# Patient Record
Sex: MALE | Race: BLACK OR AFRICAN AMERICAN | NOT HISPANIC OR LATINO | Employment: FULL TIME | URBAN - METROPOLITAN AREA
[De-identification: names, ages, dates, MRNs, and addresses within clinical notes are randomized per-mention and may not be internally consistent; named-entity substitution may affect disease eponyms.]

---

## 2018-02-07 ENCOUNTER — OFFICE VISIT (OUTPATIENT)
Dept: FAMILY MEDICINE | Facility: CLINIC | Age: 43
End: 2018-02-07
Payer: COMMERCIAL

## 2018-02-07 VITALS
WEIGHT: 216.06 LBS | DIASTOLIC BLOOD PRESSURE: 80 MMHG | HEIGHT: 71 IN | BODY MASS INDEX: 30.25 KG/M2 | SYSTOLIC BLOOD PRESSURE: 122 MMHG | RESPIRATION RATE: 18 BRPM | HEART RATE: 71 BPM | OXYGEN SATURATION: 98 % | TEMPERATURE: 98 F

## 2018-02-07 DIAGNOSIS — G89.29 CHRONIC BILATERAL LOW BACK PAIN WITHOUT SCIATICA: ICD-10-CM

## 2018-02-07 DIAGNOSIS — N52.9 ERECTILE DYSFUNCTION, UNSPECIFIED ERECTILE DYSFUNCTION TYPE: ICD-10-CM

## 2018-02-07 DIAGNOSIS — Z00.01 ANNUAL VISIT FOR GENERAL ADULT MEDICAL EXAMINATION WITH ABNORMAL FINDINGS: Primary | ICD-10-CM

## 2018-02-07 DIAGNOSIS — M54.50 CHRONIC BILATERAL LOW BACK PAIN WITHOUT SCIATICA: ICD-10-CM

## 2018-02-07 PROCEDURE — 99386 PREV VISIT NEW AGE 40-64: CPT | Mod: S$GLB,,, | Performed by: FAMILY MEDICINE

## 2018-02-07 PROCEDURE — 99999 PR PBB SHADOW E&M-NEW PATIENT-LVL IV: CPT | Mod: PBBFAC,,, | Performed by: FAMILY MEDICINE

## 2018-02-07 NOTE — PROGRESS NOTES
HPI:  Albert Escoto is a 42 y.o. year old male that  presents to become established. He has quit smoking since Sunday. He smoked about a pack a day for 15 years.  He also has complaint of low back pain  Which is sometimes both sides and sometimes on the left side. He states that the problem has been going on for years. He states that it has not been worse lately., but is it has been persistent. He has never had x-rays studies of his back. He takes Ibuprofen for pain , taking 2 - 3 at a time.  He has been having trouble with holding an erection over the last 2 years and has been getting worse. He denies any problem with his urination.He has not tried anything to help it.He level of fatigue does not seem to have an impact.  Chief Complaint   Patient presents with    [facility]     pt is trying to stop smoking--hasn't had cigarrete since Sunday    Back Pain    Erectile Dysfunction   .     HPI    History reviewed. No pertinent past medical history.  Social History     Social History    Marital status:      Spouse name: N/A    Number of children: N/A    Years of education: N/A     Occupational History    Not on file.     Social History Main Topics    Smoking status: Current Every Day Smoker     Packs/day: 1.00     Types: Cigarettes    Smokeless tobacco: Never Used    Alcohol use Yes      Comment: occasional    Drug use: No    Sexual activity: Yes     Other Topics Concern    Not on file     Social History Narrative    No narrative on file     History reviewed. No pertinent surgical history.  Family History   Problem Relation Age of Onset    Brain cancer Mother     Lung disease Mother     No Known Problems Father     No Known Problems Sister     Diabetes Brother     No Known Problems Daughter     No Known Problems Son     No Known Problems Brother     No Known Problems Brother     No Known Problems Daughter     No Known Problems Daughter     No Known Problems Daughter     No  "Known Problems Son     No Known Problems Son     No Known Problems Son     No Known Problems Son     No Known Problems Son     No Known Problems Son            Review of Systems  General ROS: negative for chills, fever or weight loss  Psychological ROS: negative for hallucination, depression or suicidal ideation  Ophthalmic ROS: negative for blurry vision, photophobia or eye pain  ENT ROS: negative for epistaxis, sore throat or rhinorrhea  Respiratory ROS: no cough, shortness of breath, or wheezing  Cardiovascular ROS: no chest pain or dyspnea on exertion  Gastrointestinal ROS: no abdominal pain, change in bowel habits, or black/ bloody stools  Genito-Urinary ROS: no dysuria, trouble voiding, or hematuria  Musculoskeletal ROS: negative for gait disturbance or muscular weakness, pos lower back pain  Neurological ROS: no syncope or seizures; no ataxia  Dermatological ROS: negative for pruritis, rash and jaundice      Physical Exam:  /80 (BP Location: Right arm, Patient Position: Sitting, BP Method: Medium (Manual))   Pulse 71   Temp 97.8 °F (36.6 °C) (Oral)   Resp 18   Ht 5' 11" (1.803 m)   Wt 98 kg (216 lb 0.8 oz)   SpO2 98%   BMI 30.13 kg/m²   General appearance: alert, cooperative, no distress  Constitutional:Oriented to person, place, and time.appears well-developed and well-nourished.  HEENT: Normocephalic, atraumatic, neck symmetrical, no nasal discharge, TM - clear bilaterally   Eyes: conjunctivae/corneas clear, PERRL, EOM's intact  Lungs: clear to auscultation bilaterally, no dullness to percussion bilaterally  Heart: regular rate and rhythm without rub; no displacement of the PMI   Abdomen: soft, non-tender; bowel sounds normoactive; no organomegaly  Extremities: extremities symmetric; no clubbing, cyanosis, or edema  Integument: Skin color, texture, turgor normal; no rashes; hair distrubution normal  Neurologic: Alert and oriented X 3, normal strength, normal coordination and " gait  Psychiatric: no pressured speech; normal affect; no evidence of impaired cognition   Physical Exam  LABS:    Complete Blood Count  Lab Results   Component Value Date    RBC 5.27 02/09/2018    HGB 14.7 02/09/2018    HCT 45.3 02/09/2018    MCV 86 02/09/2018    MCH 27.9 02/09/2018    MCHC 32.5 02/09/2018    RDW 12.6 02/09/2018     02/09/2018    MPV 11.1 02/09/2018    GRAN 3.1 02/09/2018    GRAN 46.3 02/09/2018    LYMPH 2.6 02/09/2018    LYMPH 39.2 02/09/2018    MONO 0.7 02/09/2018    MONO 11.0 02/09/2018    EOS 0.2 02/09/2018    BASO 0.05 02/09/2018    EOSINOPHIL 2.7 02/09/2018    BASOPHIL 0.8 02/09/2018    DIFFMETHOD Automated 02/09/2018       Comprehensive Metabolic Panel  Lab Results   Component Value Date    GLU 94 02/09/2018    BUN 11 02/09/2018    CREATININE 0.94 02/09/2018     02/09/2018    K 4.3 02/09/2018     02/09/2018    PROT 8.0 02/09/2018    ALBUMIN 4.5 02/09/2018    BILITOT 0.5 02/09/2018    AST 38 02/09/2018    ALKPHOS 70 02/09/2018    CO2 26 02/09/2018    ALT 32 02/09/2018    ANIONGAP 9 02/09/2018    EGFRNONAA >60.0 02/09/2018    ESTGFRAFRICA >60.0 02/09/2018       LIPID  Lab Results   Component Value Date    CHOL 260 (H) 02/09/2018    HDL 42 02/09/2018       TSH  Lab Results   Component Value Date    TSH 2.230 02/09/2018       No current outpatient prescriptions on file.     No current facility-administered medications for this visit.        Assessment:    ICD-10-CM ICD-9-CM    1. Annual visit for general adult medical examination with abnormal findings Z00.01 V70.0 Comprehensive metabolic panel      Lipid panel      CBC auto differential      TSH   2. Erectile dysfunction, unspecified erectile dysfunction type N52.9 607.84 Testosterone   3. Chronic bilateral low back pain without sciatica M54.5 724.2 Comprehensive metabolic panel    G89.29 338.29 Lipid panel      CBC auto differential      TSH      X-Ray Lumbar Spine Complete 5 View         Plan:  Encouraged pt to seek out  marital consoling for communication skills.  Follow-up in 9 days (on 2/16/2018).          Yamileth Escoto MD

## 2018-03-12 ENCOUNTER — OFFICE VISIT (OUTPATIENT)
Dept: FAMILY MEDICINE | Facility: CLINIC | Age: 43
End: 2018-03-12
Payer: COMMERCIAL

## 2018-03-12 VITALS
HEART RATE: 74 BPM | OXYGEN SATURATION: 98 % | RESPIRATION RATE: 18 BRPM | WEIGHT: 214 LBS | BODY MASS INDEX: 29.96 KG/M2 | DIASTOLIC BLOOD PRESSURE: 72 MMHG | HEIGHT: 71 IN | TEMPERATURE: 98 F | SYSTOLIC BLOOD PRESSURE: 122 MMHG

## 2018-03-12 DIAGNOSIS — E78.5 HYPERLIPIDEMIA, UNSPECIFIED HYPERLIPIDEMIA TYPE: Primary | ICD-10-CM

## 2018-03-12 PROCEDURE — 99214 OFFICE O/P EST MOD 30 MIN: CPT | Mod: S$GLB,,, | Performed by: FAMILY MEDICINE

## 2018-03-12 PROCEDURE — 99999 PR PBB SHADOW E&M-EST. PATIENT-LVL III: CPT | Mod: PBBFAC,,, | Performed by: FAMILY MEDICINE

## 2018-03-12 RX ORDER — ATORVASTATIN CALCIUM 10 MG/1
10 TABLET, FILM COATED ORAL DAILY
Qty: 90 TABLET | Refills: 3 | Status: SHIPPED | OUTPATIENT
Start: 2018-03-12 | End: 2018-06-28 | Stop reason: SDUPTHER

## 2018-03-18 NOTE — PROGRESS NOTES
HPI:  Albert Escoto is a 43 y.o. year old male that  presents for f/u of lab results. He states that since his last visit he and his wife are communicating better and have not been arguing. He admits that his life stressors and lack of great communuication skills had been getting in the way of his ability to relax at home.  Chief Complaint   Patient presents with    Follow-up     lab, xray results   .     HPI      History reviewed. No pertinent past medical history.  Social History     Social History    Marital status:      Spouse name: N/A    Number of children: N/A    Years of education: N/A     Occupational History    Not on file.     Social History Main Topics    Smoking status: Current Every Day Smoker     Packs/day: 1.00     Types: Cigarettes    Smokeless tobacco: Never Used    Alcohol use Yes      Comment: occasional    Drug use: No    Sexual activity: Yes     Other Topics Concern    Not on file     Social History Narrative    No narrative on file     History reviewed. No pertinent surgical history.  Family History   Problem Relation Age of Onset    Brain cancer Mother     Lung disease Mother     No Known Problems Father     No Known Problems Sister     Diabetes Brother     No Known Problems Daughter     No Known Problems Son     No Known Problems Brother     No Known Problems Brother     No Known Problems Daughter     No Known Problems Daughter     No Known Problems Daughter     No Known Problems Son     No Known Problems Son     No Known Problems Son     No Known Problems Son     No Known Problems Son     No Known Problems Son            Review of Systems  General ROS: negative for chills, fever or weight loss  ENT ROS: negative for epistaxis, sore throat or rhinorrhea  Respiratory ROS: no cough, shortness of breath, or wheezing  Cardiovascular ROS: no chest pain or dyspnea on exertion  Gastrointestinal ROS: no abdominal pain, change in bowel habits, or black/  "bloody stools    Physical Exam:  /72 (BP Location: Left arm, Patient Position: Sitting, BP Method: Medium (Manual))   Pulse 74   Temp 98.3 °F (36.8 °C) (Oral)   Resp 18   Ht 5' 11" (1.803 m)   Wt 97.1 kg (214 lb)   SpO2 98%   BMI 29.85 kg/m²   General appearance: alert, cooperative, no distress  Constitutional:Oriented to person, place, and time.appears well-developed and well-nourishe  Lungs: clear to auscultation bilaterally, no dullness to percussion bilaterally  Heart: regular rate and rhythm without rub; no displacement of the PMI , S1&S2 present  Physical Exam    LABS:    Complete Blood Count  Lab Results   Component Value Date    RBC 5.27 02/09/2018    HGB 14.7 02/09/2018    HCT 45.3 02/09/2018    MCV 86 02/09/2018    MCH 27.9 02/09/2018    MCHC 32.5 02/09/2018    RDW 12.6 02/09/2018     02/09/2018    MPV 11.1 02/09/2018    GRAN 3.1 02/09/2018    GRAN 46.3 02/09/2018    LYMPH 2.6 02/09/2018    LYMPH 39.2 02/09/2018    MONO 0.7 02/09/2018    MONO 11.0 02/09/2018    EOS 0.2 02/09/2018    BASO 0.05 02/09/2018    EOSINOPHIL 2.7 02/09/2018    BASOPHIL 0.8 02/09/2018    DIFFMETHOD Automated 02/09/2018       Comprehensive Metabolic Panel  Lab Results   Component Value Date    GLU 94 02/09/2018    BUN 11 02/09/2018    CREATININE 0.94 02/09/2018     02/09/2018    K 4.3 02/09/2018     02/09/2018    PROT 8.0 02/09/2018    ALBUMIN 4.5 02/09/2018    BILITOT 0.5 02/09/2018    AST 38 02/09/2018    ALKPHOS 70 02/09/2018    CO2 26 02/09/2018    ALT 32 02/09/2018    ANIONGAP 9 02/09/2018    EGFRNONAA >60.0 02/09/2018    ESTGFRAFRICA >60.0 02/09/2018       LIPID  Lab Results   Component Value Date    CHOL 260 (H) 02/09/2018    HDL 42 02/09/2018         TSH  Lab Results   Component Value Date    TSH 2.230 02/09/2018       Current Outpatient Prescriptions   Medication Sig Dispense Refill    atorvastatin (LIPITOR) 10 MG tablet Take 1 tablet (10 mg total) by mouth once daily. 90 tablet 3     No " current facility-administered medications for this visit.        Assessment:    ICD-10-CM ICD-9-CM    1. Hyperlipidemia, unspecified hyperlipidemia type E78.5 272.4 atorvastatin (LIPITOR) 10 MG tablet      Lipid panel         Plan:  Will continue conservative measures for lower back pain,.  Will start Lipitor and recheck levels in 3 months.  Follow-up in 3 months (on 6/22/2018).          Yamileth Escoto MD

## 2018-06-28 ENCOUNTER — OFFICE VISIT (OUTPATIENT)
Dept: FAMILY MEDICINE | Facility: CLINIC | Age: 43
End: 2018-06-28
Payer: COMMERCIAL

## 2018-06-28 VITALS
RESPIRATION RATE: 18 BRPM | HEIGHT: 71 IN | WEIGHT: 214 LBS | DIASTOLIC BLOOD PRESSURE: 78 MMHG | SYSTOLIC BLOOD PRESSURE: 136 MMHG | BODY MASS INDEX: 29.96 KG/M2 | TEMPERATURE: 98 F | HEART RATE: 64 BPM | OXYGEN SATURATION: 96 %

## 2018-06-28 DIAGNOSIS — L03.211 CELLULITIS OF FACE: Primary | ICD-10-CM

## 2018-06-28 DIAGNOSIS — E78.5 HYPERLIPIDEMIA, UNSPECIFIED HYPERLIPIDEMIA TYPE: ICD-10-CM

## 2018-06-28 PROCEDURE — 99214 OFFICE O/P EST MOD 30 MIN: CPT | Mod: S$GLB,,, | Performed by: FAMILY MEDICINE

## 2018-06-28 PROCEDURE — 3008F BODY MASS INDEX DOCD: CPT | Mod: CPTII,S$GLB,, | Performed by: FAMILY MEDICINE

## 2018-06-28 PROCEDURE — 99999 PR PBB SHADOW E&M-EST. PATIENT-LVL III: CPT | Mod: PBBFAC,,, | Performed by: FAMILY MEDICINE

## 2018-06-28 RX ORDER — ATORVASTATIN CALCIUM 20 MG/1
20 TABLET, FILM COATED ORAL DAILY
Qty: 90 TABLET | Refills: 0 | Status: SHIPPED | OUTPATIENT
Start: 2018-06-28 | End: 2020-02-03

## 2018-06-28 RX ORDER — SULFAMETHOXAZOLE AND TRIMETHOPRIM 800; 160 MG/1; MG/1
1 TABLET ORAL 2 TIMES DAILY
Qty: 14 TABLET | Refills: 0 | Status: SHIPPED | OUTPATIENT
Start: 2018-06-28 | End: 2018-07-05

## 2018-06-30 NOTE — PROGRESS NOTES
HPI:  Albert Escoto is a 43 y.o. year old male that  presents with concern of left eyelid swelling. It started with a bump that progressed into swelling of his left eyeid.  Chief Complaint   Patient presents with    Follow-up     lab results    Medication Refill     Lipitor    Eye Problem     left eye swollen   .     HPI      History reviewed. No pertinent past medical history.  Social History     Social History    Marital status:      Spouse name: N/A    Number of children: N/A    Years of education: N/A     Occupational History    Not on file.     Social History Main Topics    Smoking status: Current Every Day Smoker     Packs/day: 1.00     Types: Cigarettes    Smokeless tobacco: Never Used    Alcohol use Yes      Comment: occasional    Drug use: No    Sexual activity: Yes     Other Topics Concern    Not on file     Social History Narrative    No narrative on file     History reviewed. No pertinent surgical history.  Family History   Problem Relation Age of Onset    Brain cancer Mother     Lung disease Mother     No Known Problems Father     No Known Problems Sister     Diabetes Brother     No Known Problems Daughter     No Known Problems Son     No Known Problems Brother     No Known Problems Brother     No Known Problems Daughter     No Known Problems Daughter     No Known Problems Daughter     No Known Problems Son     No Known Problems Son     No Known Problems Son     No Known Problems Son     No Known Problems Son     No Known Problems Son            Review of Systems  General ROS: negative for chills, fever or weight loss  ENT ROS: negative for epistaxis, sore throat or rhinorrhea  Respiratory ROS: no cough, shortness of breath, or wheezing  Cardiovascular ROS: no chest pain or dyspnea on exertion  Gastrointestinal ROS: no abdominal pain, change in bowel habits, or black/ bloody stools    Physical Exam:  /78   Pulse 64   Temp 98 °F (36.7 °C) (Oral)   Resp  "18   Ht 5' 11" (1.803 m)   Wt 97.1 kg (214 lb)   SpO2 96%   BMI 29.85 kg/m²   General appearance: alert, cooperative, no distress  Constitutional:Oriented to person, place, and time.appears well-developed and well-nourished.  HEENT: Normocephalic, atraumatic, neck symmetrical, no nasal discharge, TM- clear bilaterally  Lungs: clear to auscultation bilaterally, no dullness to percussion bilaterally  Heart: regular rate and rhythm without rub; no displacement of the PMI , S1&S2 present  Abdomen: soft, non-tender; bowel sounds normoactive; no organomegaly  Skin   ; left eyelid with swelling and lateral single swelling on left eyelid  Physical Exam    LABS:    Complete Blood Count  Lab Results   Component Value Date    RBC 5.27 02/09/2018    HGB 14.7 02/09/2018    HCT 45.3 02/09/2018    MCV 86 02/09/2018    MCH 27.9 02/09/2018    MCHC 32.5 02/09/2018    RDW 12.6 02/09/2018     02/09/2018    MPV 11.1 02/09/2018    GRAN 3.1 02/09/2018    GRAN 46.3 02/09/2018    LYMPH 2.6 02/09/2018    LYMPH 39.2 02/09/2018    MONO 0.7 02/09/2018    MONO 11.0 02/09/2018    EOS 0.2 02/09/2018    BASO 0.05 02/09/2018    EOSINOPHIL 2.7 02/09/2018    BASOPHIL 0.8 02/09/2018    DIFFMETHOD Automated 02/09/2018       Comprehensive Metabolic Panel  Lab Results   Component Value Date    GLU 94 02/09/2018    BUN 11 02/09/2018    CREATININE 0.94 02/09/2018     02/09/2018    K 4.3 02/09/2018     02/09/2018    PROT 8.0 02/09/2018    ALBUMIN 4.5 02/09/2018    BILITOT 0.5 02/09/2018    AST 38 02/09/2018    ALKPHOS 70 02/09/2018    CO2 26 02/09/2018    ALT 32 02/09/2018    ANIONGAP 9 02/09/2018    EGFRNONAA >60.0 02/09/2018    ESTGFRAFRICA >60.0 02/09/2018       LIPID  Lab Results   Component Value Date    CHOL 201 (H) 06/15/2018    HDL 40 06/15/2018         TSH  Lab Results   Component Value Date    TSH 2.230 02/09/2018       Current Outpatient Prescriptions   Medication Sig Dispense Refill    atorvastatin (LIPITOR) 20 MG tablet " Take 1 tablet (20 mg total) by mouth once daily. 90 tablet 0    sulfamethoxazole-trimethoprim 800-160mg (BACTRIM DS) 800-160 mg Tab Take 1 tablet by mouth 2 (two) times daily. for 7 days 14 tablet 0     No current facility-administered medications for this visit.        Assessment:    ICD-10-CM ICD-9-CM    1. Cellulitis of face L03.211 682.0 sulfamethoxazole-trimethoprim 800-160mg (BACTRIM DS) 800-160 mg Tab   2. Hyperlipidemia, unspecified hyperlipidemia type E78.5 272.4 atorvastatin (LIPITOR) 20 MG tablet         Plan:    Follow-up in 3 months (on 9/28/2018).          Yamileth Escoto MD

## 2020-01-20 ENCOUNTER — OFFICE VISIT (OUTPATIENT)
Dept: FAMILY MEDICINE | Facility: CLINIC | Age: 45
End: 2020-01-20
Payer: COMMERCIAL

## 2020-01-20 VITALS
HEART RATE: 73 BPM | WEIGHT: 213.38 LBS | TEMPERATURE: 98 F | OXYGEN SATURATION: 98 % | SYSTOLIC BLOOD PRESSURE: 122 MMHG | DIASTOLIC BLOOD PRESSURE: 90 MMHG | HEIGHT: 71 IN | BODY MASS INDEX: 29.87 KG/M2

## 2020-01-20 DIAGNOSIS — Z13.220 SCREENING FOR HYPERLIPIDEMIA: ICD-10-CM

## 2020-01-20 DIAGNOSIS — R03.0 ELEVATED BLOOD PRESSURE READING WITHOUT DIAGNOSIS OF HYPERTENSION: ICD-10-CM

## 2020-01-20 DIAGNOSIS — Z13.29 THYROID DISORDER SCREENING: ICD-10-CM

## 2020-01-20 DIAGNOSIS — N52.9 ERECTILE DYSFUNCTION, UNSPECIFIED ERECTILE DYSFUNCTION TYPE: Primary | ICD-10-CM

## 2020-01-20 DIAGNOSIS — E66.3 OVERWEIGHT (BMI 25.0-29.9): ICD-10-CM

## 2020-01-20 DIAGNOSIS — Z12.5 SCREENING FOR PROSTATE CANCER: ICD-10-CM

## 2020-01-20 DIAGNOSIS — Z13.0 SCREENING FOR DEFICIENCY ANEMIA: ICD-10-CM

## 2020-01-20 DIAGNOSIS — H61.23 BILATERAL IMPACTED CERUMEN: ICD-10-CM

## 2020-01-20 LAB
ALBUMIN SERPL BCP-MCNC: 4.3 G/DL (ref 3.5–5.2)
ALP SERPL-CCNC: 79 U/L (ref 55–135)
ALT SERPL W/O P-5'-P-CCNC: 18 U/L (ref 10–44)
ANION GAP SERPL CALC-SCNC: 8 MMOL/L (ref 8–16)
AST SERPL-CCNC: 21 U/L (ref 10–40)
BASOPHILS # BLD AUTO: 0.06 K/UL (ref 0–0.2)
BASOPHILS NFR BLD: 0.9 % (ref 0–1.9)
BILIRUB SERPL-MCNC: 0.5 MG/DL (ref 0.1–1)
BUN SERPL-MCNC: 11 MG/DL (ref 6–20)
CALCIUM SERPL-MCNC: 9.6 MG/DL (ref 8.7–10.5)
CHLORIDE SERPL-SCNC: 107 MMOL/L (ref 95–110)
CHOLEST SERPL-MCNC: 243 MG/DL (ref 120–199)
CHOLEST/HDLC SERPL: 6.1 {RATIO} (ref 2–5)
CO2 SERPL-SCNC: 25 MMOL/L (ref 23–29)
COMPLEXED PSA SERPL-MCNC: 0.36 NG/ML (ref 0–4)
CREAT SERPL-MCNC: 0.9 MG/DL (ref 0.5–1.4)
DIFFERENTIAL METHOD: ABNORMAL
EOSINOPHIL # BLD AUTO: 0.1 K/UL (ref 0–0.5)
EOSINOPHIL NFR BLD: 1.4 % (ref 0–8)
ERYTHROCYTE [DISTWIDTH] IN BLOOD BY AUTOMATED COUNT: 12.8 % (ref 11.5–14.5)
EST. GFR  (AFRICAN AMERICAN): >60 ML/MIN/1.73 M^2
EST. GFR  (NON AFRICAN AMERICAN): >60 ML/MIN/1.73 M^2
GLUCOSE SERPL-MCNC: 95 MG/DL (ref 70–110)
HCT VFR BLD AUTO: 47.2 % (ref 40–54)
HDLC SERPL-MCNC: 40 MG/DL (ref 40–75)
HDLC SERPL: 16.5 % (ref 20–50)
HGB BLD-MCNC: 14.5 G/DL (ref 14–18)
IMM GRANULOCYTES # BLD AUTO: 0.02 K/UL (ref 0–0.04)
IMM GRANULOCYTES NFR BLD AUTO: 0.3 % (ref 0–0.5)
LDLC SERPL CALC-MCNC: 176.4 MG/DL (ref 63–159)
LYMPHOCYTES # BLD AUTO: 2.3 K/UL (ref 1–4.8)
LYMPHOCYTES NFR BLD: 34.9 % (ref 18–48)
MCH RBC QN AUTO: 28.1 PG (ref 27–31)
MCHC RBC AUTO-ENTMCNC: 30.7 G/DL (ref 32–36)
MCV RBC AUTO: 92 FL (ref 82–98)
MONOCYTES # BLD AUTO: 0.7 K/UL (ref 0.3–1)
MONOCYTES NFR BLD: 10.5 % (ref 4–15)
NEUTROPHILS # BLD AUTO: 3.5 K/UL (ref 1.8–7.7)
NEUTROPHILS NFR BLD: 52 % (ref 38–73)
NONHDLC SERPL-MCNC: 203 MG/DL
NRBC BLD-RTO: 0 /100 WBC
PLATELET # BLD AUTO: 183 K/UL (ref 150–350)
PMV BLD AUTO: 11.2 FL (ref 9.2–12.9)
POTASSIUM SERPL-SCNC: 4.3 MMOL/L (ref 3.5–5.1)
PROT SERPL-MCNC: 7.8 G/DL (ref 6–8.4)
RBC # BLD AUTO: 5.16 M/UL (ref 4.6–6.2)
SODIUM SERPL-SCNC: 140 MMOL/L (ref 136–145)
TRIGL SERPL-MCNC: 133 MG/DL (ref 30–150)
TSH SERPL DL<=0.005 MIU/L-ACNC: 1.25 UIU/ML (ref 0.4–4)
WBC # BLD AUTO: 6.64 K/UL (ref 3.9–12.7)

## 2020-01-20 PROCEDURE — 99214 OFFICE O/P EST MOD 30 MIN: CPT | Mod: 25,S$GLB,, | Performed by: NURSE PRACTITIONER

## 2020-01-20 PROCEDURE — 69209 REMOVE IMPACTED EAR WAX UNI: CPT | Mod: 50,S$GLB,, | Performed by: NURSE PRACTITIONER

## 2020-01-20 PROCEDURE — 85025 COMPLETE CBC W/AUTO DIFF WBC: CPT

## 2020-01-20 PROCEDURE — 99214 PR OFFICE/OUTPT VISIT, EST, LEVL IV, 30-39 MIN: ICD-10-PCS | Mod: 25,S$GLB,, | Performed by: NURSE PRACTITIONER

## 2020-01-20 PROCEDURE — 84443 ASSAY THYROID STIM HORMONE: CPT

## 2020-01-20 PROCEDURE — 99999 PR PBB SHADOW E&M-EST. PATIENT-LVL III: CPT | Mod: PBBFAC,,, | Performed by: NURSE PRACTITIONER

## 2020-01-20 PROCEDURE — 80053 COMPREHEN METABOLIC PANEL: CPT

## 2020-01-20 PROCEDURE — 3008F PR BODY MASS INDEX (BMI) DOCUMENTED: ICD-10-PCS | Mod: CPTII,S$GLB,, | Performed by: NURSE PRACTITIONER

## 2020-01-20 PROCEDURE — 84153 ASSAY OF PSA TOTAL: CPT

## 2020-01-20 PROCEDURE — 80061 LIPID PANEL: CPT

## 2020-01-20 PROCEDURE — 3008F BODY MASS INDEX DOCD: CPT | Mod: CPTII,S$GLB,, | Performed by: NURSE PRACTITIONER

## 2020-01-20 PROCEDURE — 99999 PR PBB SHADOW E&M-EST. PATIENT-LVL III: ICD-10-PCS | Mod: PBBFAC,,, | Performed by: NURSE PRACTITIONER

## 2020-01-20 PROCEDURE — 36415 COLL VENOUS BLD VENIPUNCTURE: CPT

## 2020-01-20 PROCEDURE — 69209 EAR CERUMEN REMOVAL: ICD-10-PCS | Mod: 50,S$GLB,, | Performed by: NURSE PRACTITIONER

## 2020-01-20 RX ORDER — TADALAFIL 10 MG/1
10 TABLET ORAL DAILY PRN
Qty: 10 TABLET | Refills: 0 | Status: SHIPPED | OUTPATIENT
Start: 2020-01-20 | End: 2020-02-03 | Stop reason: SDUPTHER

## 2020-01-20 NOTE — PROGRESS NOTES
Subjective:       Patient ID: Albert Escoto is a 44 y.o. male.    Chief Complaint: Erectile Dysfunction (pt states having issues with ED, and would like a check up. Pt wants Flu shot as well.)    43 y/o male with history of hyperlipidemia and erectile dysfunction presents to clinic for follow up.     THIS PATIENT IS NEW TO ME. PREVIOUS PATIENT OF DR. NELSY ESCOTO.    Patient has history of hyperlipidemia. Previously prescribed atorvastatin in 2018. States he has not taken the medication in > 1 year. Denies medication side effects. States he was unsure if he really needed the medication.    Patient reports history of erectile dysfunction. Normal testosterone level in 2018. He has not tried any medication in the past. Reports problems with obtain an erection sometimes. Denies dysuria, urinary frequency or urgency.    CBC:  Recent Labs   Lab 02/09/18 0612   WBC 6.64   RBC 5.27   Hemoglobin 14.7   Hematocrit 45.3   Platelets 192   Mean Corpuscular Volume 86   Mean Corpuscular Hemoglobin 27.9   Mean Corpuscular Hemoglobin Conc 32.5     CMP:  Recent Labs   Lab 02/09/18 0612   Glucose 94   Calcium 9.7   Albumin 4.5   Total Protein 8.0   Sodium 141   Potassium 4.3   CO2 26   Chloride 106   BUN, Bld 11   Creatinine 0.94   Alkaline Phosphatase 70   ALT 32   AST 38   Total Bilirubin 0.5     LIPIDS:  Recent Labs   Lab 02/09/18  0612 06/15/18  0846   TSH 2.230  --    HDL 42 40   Cholesterol 260 H 201 H   Triglycerides 182 H 117   LDL Cholesterol 181.6 H 137.6   Hdl/Cholesterol Ratio 16.2 L 19.9 L   Non-HDL Cholesterol 218 161   Total Cholesterol/HDL Ratio 6.2 H 5.0     TSH:  Recent Labs   Lab 02/09/18 0612   TSH 2.230       Current Outpatient Medications   Medication Sig Dispense Refill    atorvastatin (LIPITOR) 20 MG tablet Take 1 tablet (20 mg total) by mouth once daily. (Patient not taking: Reported on 1/20/2020) 90 tablet 0    tadalafil (CIALIS) 10 MG tablet Take 1 tablet (10 mg total) by mouth daily as needed  for Erectile Dysfunction. 10 tablet 0     No current facility-administered medications for this visit.        History reviewed. No pertinent past medical history.    History reviewed. No pertinent surgical history.    Family History   Problem Relation Age of Onset    Brain cancer Mother     Lung disease Mother     No Known Problems Father     No Known Problems Sister     Diabetes Brother     No Known Problems Daughter     No Known Problems Son     No Known Problems Brother     No Known Problems Brother     No Known Problems Daughter     No Known Problems Daughter     No Known Problems Daughter     No Known Problems Son     No Known Problems Son     No Known Problems Son     No Known Problems Son     No Known Problems Son     No Known Problems Son        Social History     Socioeconomic History    Marital status:      Spouse name: Not on file    Number of children: Not on file    Years of education: Not on file    Highest education level: Not on file   Occupational History    Not on file   Social Needs    Financial resource strain: Not on file    Food insecurity:     Worry: Not on file     Inability: Not on file    Transportation needs:     Medical: Not on file     Non-medical: Not on file   Tobacco Use    Smoking status: Current Every Day Smoker     Packs/day: 1.00     Types: Cigarettes    Smokeless tobacco: Never Used   Substance and Sexual Activity    Alcohol use: Yes     Comment: occasional    Drug use: No    Sexual activity: Yes   Lifestyle    Physical activity:     Days per week: Not on file     Minutes per session: Not on file    Stress: Not on file   Relationships    Social connections:     Talks on phone: Not on file     Gets together: Not on file     Attends Worship service: Not on file     Active member of club or organization: Not on file     Attends meetings of clubs or organizations: Not on file     Relationship status: Not on file   Other Topics Concern     "Not on file   Social History Narrative    Not on file       Review of Systems   Constitutional: Negative for fatigue, fever and unexpected weight change.   HENT: Negative for congestion, nosebleeds, sore throat and trouble swallowing.    Eyes: Negative for visual disturbance.   Respiratory: Negative for cough and shortness of breath.    Cardiovascular: Negative for chest pain and palpitations.   Gastrointestinal: Negative for abdominal pain and constipation.   Genitourinary: Negative for difficulty urinating, dysuria and urgency.   Musculoskeletal: Negative for back pain and myalgias.   Allergic/Immunologic: Negative for immunocompromised state.   Neurological: Negative for weakness and headaches.   Hematological: Negative for adenopathy. Does not bruise/bleed easily.   Psychiatric/Behavioral: Negative for dysphoric mood.         Objective:     Vitals:    01/20/20 1103   BP: (!) 122/90   Pulse: 73   Temp: 97.7 °F (36.5 °C)   TempSrc: Oral   SpO2: 98%   Weight: 96.8 kg (213 lb 6.5 oz)   Height: 5' 11" (1.803 m)          Physical Exam   Constitutional: He is oriented to person, place, and time. He appears well-developed and well-nourished. No distress.   HENT:   Head: Normocephalic.   Nose: Nose normal.   Mouth/Throat: Uvula is midline, oropharynx is clear and moist and mucous membranes are normal.   Bilateral ear canal impacted with cerumen. Normal TMs and ear canal post irrigation.   Eyes: Pupils are equal, round, and reactive to light.   Neck: Normal range of motion. Neck supple.   Cardiovascular: Normal rate, regular rhythm and intact distal pulses.   Pulmonary/Chest: Effort normal and breath sounds normal.   Abdominal: Soft. Bowel sounds are normal.   Musculoskeletal: Normal range of motion.   Neurological: He is alert and oriented to person, place, and time. Gait normal.   Skin: Skin is warm and dry.   Psychiatric: He has a normal mood and affect. His behavior is normal.         Assessment:         ICD-10-CM " ICD-9-CM   1. Erectile dysfunction, unspecified erectile dysfunction type N52.9 607.84   2. Bilateral impacted cerumen H61.23 380.4   3. Elevated blood pressure reading without diagnosis of hypertension R03.0 796.2   4. Overweight (BMI 25.0-29.9) E66.3 278.02   5. Screening for deficiency anemia Z13.0 V78.1   6. Screening for hyperlipidemia Z13.220 V77.91   7. Screening for prostate cancer Z12.5 V76.44   8. Thyroid disorder screening Z13.29 V77.0       Plan:       Erectile dysfunction, unspecified erectile dysfunction type  -     tadalafil (CIALIS) 10 MG tablet; Take 1 tablet (10 mg total) by mouth daily as needed for Erectile Dysfunction.  Dispense: 10 tablet; Refill: 0    Bilateral impacted cerumen  -     Ear Cerumen Removal    Elevated blood pressure reading without diagnosis of hypertension        -     Keep log of blood pressure measurements twice daily, bring log to next appointment.    Overweight (BMI 25.0-29.9)       -      Advised healthy diet and exercise    Screening for deficiency anemia  -     CBC auto differential    Screening for hyperlipidemia  -     Comprehensive metabolic panel  -     Lipid panel    Screening for prostate cancer  -     PSA, Screening    Thyroid disorder screening  -     TSH      Follow up in about 2 weeks (around 2/3/2020) for for blood pressure check.     Patient's Medications   New Prescriptions    TADALAFIL (CIALIS) 10 MG TABLET    Take 1 tablet (10 mg total) by mouth daily as needed for Erectile Dysfunction.   Previous Medications    ATORVASTATIN (LIPITOR) 20 MG TABLET    Take 1 tablet (20 mg total) by mouth once daily.   Modified Medications    No medications on file   Discontinued Medications    No medications on file

## 2020-01-20 NOTE — PROCEDURES
Ear Cerumen Removal  Date/Time: 1/20/2020 11:00 AM  Performed by: AMI Crane  Authorized by: AMI Crane     Consent Done?:  Yes (Verbal)    Local anesthetic:  None  Location details:  Both ears  Procedure type: irrigation    Cerumen  Removal Results:  Cerumen completely removed  Patient tolerance:  Patient tolerated the procedure well with no immediate complications

## 2020-02-03 ENCOUNTER — OFFICE VISIT (OUTPATIENT)
Dept: FAMILY MEDICINE | Facility: CLINIC | Age: 45
End: 2020-02-03
Payer: COMMERCIAL

## 2020-02-03 VITALS
HEIGHT: 71 IN | WEIGHT: 210.63 LBS | HEART RATE: 77 BPM | TEMPERATURE: 98 F | DIASTOLIC BLOOD PRESSURE: 84 MMHG | SYSTOLIC BLOOD PRESSURE: 126 MMHG | BODY MASS INDEX: 29.49 KG/M2 | OXYGEN SATURATION: 97 %

## 2020-02-03 DIAGNOSIS — Z11.4 SCREENING FOR HIV WITHOUT PRESENCE OF RISK FACTORS: ICD-10-CM

## 2020-02-03 DIAGNOSIS — F17.210 TOBACCO DEPENDENCE DUE TO CIGARETTES: ICD-10-CM

## 2020-02-03 DIAGNOSIS — E66.3 OVERWEIGHT (BMI 25.0-29.9): ICD-10-CM

## 2020-02-03 DIAGNOSIS — Z23 NEEDS FLU SHOT: ICD-10-CM

## 2020-02-03 DIAGNOSIS — E78.5 HYPERLIPIDEMIA, UNSPECIFIED HYPERLIPIDEMIA TYPE: Primary | ICD-10-CM

## 2020-02-03 DIAGNOSIS — Z23 NEED FOR TDAP VACCINATION: ICD-10-CM

## 2020-02-03 DIAGNOSIS — N52.9 ERECTILE DYSFUNCTION, UNSPECIFIED ERECTILE DYSFUNCTION TYPE: ICD-10-CM

## 2020-02-03 PROCEDURE — 99214 OFFICE O/P EST MOD 30 MIN: CPT | Mod: 25,S$GLB,, | Performed by: NURSE PRACTITIONER

## 2020-02-03 PROCEDURE — 90471 IMMUNIZATION ADMIN: CPT | Mod: S$GLB,,, | Performed by: NURSE PRACTITIONER

## 2020-02-03 PROCEDURE — 99999 PR PBB SHADOW E&M-EST. PATIENT-LVL IV: CPT | Mod: PBBFAC,,, | Performed by: NURSE PRACTITIONER

## 2020-02-03 PROCEDURE — 99999 PR PBB SHADOW E&M-EST. PATIENT-LVL IV: ICD-10-PCS | Mod: PBBFAC,,, | Performed by: NURSE PRACTITIONER

## 2020-02-03 PROCEDURE — 90686 FLU VACCINE (QUAD) GREATER THAN OR EQUAL TO 3YO PRESERVATIVE FREE IM: ICD-10-PCS | Mod: S$GLB,,, | Performed by: NURSE PRACTITIONER

## 2020-02-03 PROCEDURE — 3008F BODY MASS INDEX DOCD: CPT | Mod: CPTII,S$GLB,, | Performed by: NURSE PRACTITIONER

## 2020-02-03 PROCEDURE — 90686 IIV4 VACC NO PRSV 0.5 ML IM: CPT | Mod: S$GLB,,, | Performed by: NURSE PRACTITIONER

## 2020-02-03 PROCEDURE — 3008F PR BODY MASS INDEX (BMI) DOCUMENTED: ICD-10-PCS | Mod: CPTII,S$GLB,, | Performed by: NURSE PRACTITIONER

## 2020-02-03 PROCEDURE — 99214 PR OFFICE/OUTPT VISIT, EST, LEVL IV, 30-39 MIN: ICD-10-PCS | Mod: 25,S$GLB,, | Performed by: NURSE PRACTITIONER

## 2020-02-03 PROCEDURE — 90472 TDAP VACCINE GREATER THAN OR EQUAL TO 7YO IM: ICD-10-PCS | Mod: S$GLB,,, | Performed by: NURSE PRACTITIONER

## 2020-02-03 PROCEDURE — 90715 TDAP VACCINE 7 YRS/> IM: CPT | Mod: S$GLB,,, | Performed by: NURSE PRACTITIONER

## 2020-02-03 PROCEDURE — 90715 TDAP VACCINE GREATER THAN OR EQUAL TO 7YO IM: ICD-10-PCS | Mod: S$GLB,,, | Performed by: NURSE PRACTITIONER

## 2020-02-03 PROCEDURE — 90472 IMMUNIZATION ADMIN EACH ADD: CPT | Mod: S$GLB,,, | Performed by: NURSE PRACTITIONER

## 2020-02-03 PROCEDURE — 90471 FLU VACCINE (QUAD) GREATER THAN OR EQUAL TO 3YO PRESERVATIVE FREE IM: ICD-10-PCS | Mod: S$GLB,,, | Performed by: NURSE PRACTITIONER

## 2020-02-03 RX ORDER — TADALAFIL 10 MG/1
10 TABLET ORAL DAILY PRN
Qty: 10 TABLET | Refills: 0 | Status: SHIPPED | OUTPATIENT
Start: 2020-02-03 | End: 2020-02-28 | Stop reason: SDUPTHER

## 2020-02-03 NOTE — PROGRESS NOTES
Subjective:       Patient ID: Albert Escoto is a 44 y.o. male.    Chief Complaint: Follow-up (2 week follow up )    43 y/o male with history of hyperlipidemia and erectile dysfunction presents to clinic for follow up and lab results.      Patient has history of hyperlipidemia. Total cholesterol 243 and . Advised lifestyle modification including low cholesterol diet. Will repeat lipid panel in 6 months      Patient reports history of erectile dysfunction. Normal testosterone level in 2018. States Tanishalis prn is working well. Denies medication side effects.    Patient is a current 1/2 pack cigarette smoker. Does not have desire to quit.     Component      Latest Ref Rng & Units 1/20/2020 6/15/2018   WBC      3.90 - 12.70 K/uL 6.64    RBC      4.60 - 6.20 M/uL 5.16    Hemoglobin      14.0 - 18.0 g/dL 14.5    Hematocrit      40.0 - 54.0 % 47.2    MCV      82 - 98 fL 92    MCH      27.0 - 31.0 pg 28.1    MCHC      32.0 - 36.0 g/dL 30.7 (L)    RDW      11.5 - 14.5 % 12.8    Platelets      150 - 350 K/uL 183    MPV      9.2 - 12.9 fL 11.2    Immature Granulocytes      0.0 - 0.5 % 0.3    Gran # (ANC)      1.8 - 7.7 K/uL 3.5    Immature Grans (Abs)      0.00 - 0.04 K/uL 0.02    Lymph #      1.0 - 4.8 K/uL 2.3    Mono #      0.3 - 1.0 K/uL 0.7    Eos #      0.0 - 0.5 K/uL 0.1    Baso #      0.00 - 0.20 K/uL 0.06    nRBC      0 /100 WBC 0    Gran%      38.0 - 73.0 % 52.0    Lymph%      18.0 - 48.0 % 34.9    Mono%      4.0 - 15.0 % 10.5    Eosinophil%      0.0 - 8.0 % 1.4    Basophil%      0.0 - 1.9 % 0.9    Differential Method       Automated    Sodium      136 - 145 mmol/L 140    Potassium      3.5 - 5.1 mmol/L 4.3    Chloride      95 - 110 mmol/L 107    CO2      23 - 29 mmol/L 25    Glucose      70 - 110 mg/dL 95    BUN, Bld      6 - 20 mg/dL 11    Creatinine      0.5 - 1.4 mg/dL 0.9    Calcium      8.7 - 10.5 mg/dL 9.6    PROTEIN TOTAL      6.0 - 8.4 g/dL 7.8    Albumin      3.5 - 5.2 g/dL 4.3    BILIRUBIN  TOTAL      0.1 - 1.0 mg/dL 0.5    Alkaline Phosphatase      55 - 135 U/L 79    AST      10 - 40 U/L 21    ALT      10 - 44 U/L 18    Anion Gap      8 - 16 mmol/L 8    eGFR if African American      >60 mL/min/1.73 m:2 >60.0    eGFR if non African American      >60 mL/min/1.73 m:2 >60.0    Cholesterol      120 - 199 mg/dL 243 (H) 201 (H)   Triglycerides      30 - 150 mg/dL 133 117   HDL      40 - 75 mg/dL 40 40   LDL Cholesterol External      63.0 - 159.0 mg/dL 176.4 (H) 137.6   Hdl/Cholesterol Ratio      20.0 - 50.0 % 16.5 (L) 19.9 (L)   Total Cholesterol/HDL Ratio      2.0 - 5.0 6.1 (H) 5.0   Non-HDL Cholesterol      mg/dL 203 161   TSH      0.400 - 4.000 uIU/mL 1.254    PSA, SCREEN      0.00 - 4.00 ng/mL 0.36      Current Outpatient Medications   Medication Sig Dispense Refill    tadalafil (CIALIS) 10 MG tablet Take 1 tablet (10 mg total) by mouth daily as needed for Erectile Dysfunction. 10 tablet 0     No current facility-administered medications for this visit.        History reviewed. No pertinent past medical history.    History reviewed. No pertinent surgical history.    Family History   Problem Relation Age of Onset    Brain cancer Mother     Lung disease Mother     No Known Problems Father     No Known Problems Sister     Diabetes Brother     No Known Problems Daughter     No Known Problems Son     No Known Problems Brother     No Known Problems Brother     No Known Problems Daughter     No Known Problems Daughter     No Known Problems Daughter     No Known Problems Son     No Known Problems Son     No Known Problems Son     No Known Problems Son     No Known Problems Son     No Known Problems Son        Social History     Socioeconomic History    Marital status:      Spouse name: Not on file    Number of children: Not on file    Years of education: Not on file    Highest education level: Not on file   Occupational History    Not on file   Social Needs    Financial resource  "strain: Not on file    Food insecurity:     Worry: Not on file     Inability: Not on file    Transportation needs:     Medical: Not on file     Non-medical: Not on file   Tobacco Use    Smoking status: Current Every Day Smoker     Packs/day: 1.00     Types: Cigarettes    Smokeless tobacco: Never Used   Substance and Sexual Activity    Alcohol use: Yes     Comment: occasional    Drug use: No    Sexual activity: Yes   Lifestyle    Physical activity:     Days per week: Not on file     Minutes per session: Not on file    Stress: Not on file   Relationships    Social connections:     Talks on phone: Not on file     Gets together: Not on file     Attends [employer] service: Not on file     Active member of club or organization: Not on file     Attends meetings of clubs or organizations: Not on file     Relationship status: Not on file   Other Topics Concern    Not on file   Social History Narrative    Not on file       Review of Systems   Constitutional: Negative for fatigue, fever and unexpected weight change.   HENT: Negative for congestion, nosebleeds, sore throat and trouble swallowing.    Eyes: Negative for visual disturbance.   Respiratory: Negative for cough and shortness of breath.    Cardiovascular: Negative for chest pain and palpitations.   Gastrointestinal: Negative for abdominal pain and constipation.   Genitourinary: Negative for difficulty urinating, dysuria and urgency.   Musculoskeletal: Negative for back pain and myalgias.   Allergic/Immunologic: Negative for immunocompromised state.   Neurological: Negative for weakness and headaches.   Hematological: Negative for adenopathy. Does not bruise/bleed easily.   Psychiatric/Behavioral: Negative for dysphoric mood.         Objective:     Vitals:    02/03/20 1118   BP: 126/84   Pulse: 77   Temp: 97.9 °F (36.6 °C)   TempSrc: Oral   SpO2: 97%   Weight: 95.5 kg (210 lb 10.4 oz)   Height: 5' 11" (1.803 m)          Physical Exam   Constitutional: He is " oriented to person, place, and time. He appears well-developed and well-nourished. No distress.   HENT:   Head: Normocephalic.   Nose: Nose normal.   Mouth/Throat: Uvula is midline, oropharynx is clear and moist and mucous membranes are normal.   Eyes: Pupils are equal, round, and reactive to light.   Neck: Normal range of motion. Neck supple.   Cardiovascular: Normal rate, regular rhythm and intact distal pulses.   Pulmonary/Chest: Effort normal and breath sounds normal.   Musculoskeletal: Normal range of motion.   Neurological: He is alert and oriented to person, place, and time. Gait normal.   Skin: Skin is warm and dry.   Psychiatric: He has a normal mood and affect. His behavior is normal.         Assessment:         ICD-10-CM ICD-9-CM   1. Hyperlipidemia, unspecified hyperlipidemia type E78.5 272.4   2. Overweight (BMI 25.0-29.9) E66.3 278.02   3. Erectile dysfunction, unspecified erectile dysfunction type N52.9 607.84   4. Tobacco dependence due to cigarettes F17.210 305.1   5. Needs flu shot Z23 V04.81   6. Need for Tdap vaccination Z23 V06.1   7. Screening for HIV without presence of risk factors Z11.4 V73.89       Plan:       Hyperlipidemia, unspecified hyperlipidemia type  -     Lipid panel; Future; Expected date: 02/03/2020  -     Comprehensive metabolic panel; Future; Expected date: 02/03/2020    Overweight (BMI 25.0-29.9)    Erectile dysfunction, unspecified erectile dysfunction type  -     tadalafil (CIALIS) 10 MG tablet; Take 1 tablet (10 mg total) by mouth daily as needed for Erectile Dysfunction.  Dispense: 10 tablet; Refill: 0    Tobacco dependence due to cigarettes       -    Encouraged smoking cessation       -    Patient declined referral to smoking cessation program    Needs flu shot  -     Influenza - Quadrivalent (6 months+) (PF)    Need for Tdap vaccination  -     (In Office Administered) Tdap Vaccine    Screening for HIV without presence of risk factors  -     HIV 1/2 Ag/Ab (4th Gen);  Future; Expected date: 02/03/2020      Follow up in about 6 months (around 8/3/2020) for lab results, medication management.     Patient's Medications   New Prescriptions    No medications on file   Previous Medications    No medications on file   Modified Medications    Modified Medication Previous Medication    TADALAFIL (CIALIS) 10 MG TABLET tadalafil (CIALIS) 10 MG tablet       Take 1 tablet (10 mg total) by mouth daily as needed for Erectile Dysfunction.    Take 1 tablet (10 mg total) by mouth daily as needed for Erectile Dysfunction.   Discontinued Medications    ATORVASTATIN (LIPITOR) 20 MG TABLET    Take 1 tablet (20 mg total) by mouth once daily.

## 2020-02-03 NOTE — PATIENT INSTRUCTIONS
Lifestyle Changes to Control Cholesterol  You can control your cholesterol through diet, exercise, weight management, quitting smoking, stress management, and taking your medicines right. These things can also lower your risk for cardiovascular disease.    Eating healthy  Your healthcare provider will give you information on diet changes you may need to make. Your provider may recommend that you see a registered dietitian for help with diet changes. Changes may include:  · Cutting back on the amount of fat and cholesterol in your meals  · Eating less salt (sodium). This is especially important if you have high blood pressure.  · Eating more fresh vegetables and fruits  · Eating lean proteins such as fish, poultry, beans, and peas  · Eating less red meat and processed meats  · Using low-fat dairy products  · Using vegetable and nut oils in limited amounts  · Limiting how many sweets and processed foods like chips, cookies, and baked goods that you eat   · Limiting how many sugar-sweetened beverages you drink  · Limiting how often you eat out  Getting exercise  Regular exercise is a good way to help your body control cholesterol. Regular exercise can help in many ways. It can:  · Raise your good cholesterol  · Help lower your bad cholesterol  · Let blood flow better through your body  · Give more oxygen to your muscles and tissues  · Help you manage your weight  · Help your heart pump better  · Lower your blood pressure  Your healthcare provider may recommend that you get more physical activity if you haven't been active. Your provider may recommend that you get moderate to vigorous physical activity for at least 40 minutes each day. You should do this for at least 3 to 4 days each week. A few examples of moderate to vigorous activity are:  · Walking at a brisk pace. This is about 3 to 4 miles per hour.  · Jogging or running  · Swimming or water aerobics  · Hiking  · Dancing  · Martial arts  · Tennis  · Riding a  bicycle or stationary bike  · Dancing  Managing your weight  If you are overweight or obese, your healthcare provider will work with you to help you lose weight and lower your BMI (body mass index). Making diet changes and getting more physical activity can help. Changing your diet will help you lose weight more easily than adding exercise.  Quitting smoking  Smoking and other tobacco use can raise cholesterol and make it harder to control. Quitting is tough. But millions of people have given up tobacco for good. You can quit, too! Think about some of the reasons below to quit smoking. Do any of them make you think twice about your smoking habit?  Stop smoking because it:  · Keeps your cholesterol high, even if you make all the other changes youre supposed to  · Damages your body. It especially harms your heart, lungs, skin, and blood vessels.  · Makes you more likely to have a heart attack (acute myocardial infarction), stroke, or cancer  · Stains your teeth  · Makes your skin, clothes, and breath smell bad  · Costs a lot of money  Controlling stress   Learn ways to control stress. This will help you deal with stress in your home and work life. Controlling stress can greatly lower your risk of getting cardiovascular disease.  Making the most of medicines  Healthy eating and exercise are a good start to keeping your cholesterol down. But you may need some extra help from medicine. If your doctor prescribes medicine, be sure to take it exactly as directed. Remember:  · Tell your healthcare provider about all other medicines you take. This includes vitamins and herbs.  · Tell your healthcare provider if you have any side effects after starting to take a medicine. Examples of side effects to watch for include muscle aches, weakness, blurred vision, rust-colored urine, yellowing of eyes or skin (jaundice), and headache.  · Dont skip a dose or stop taking your medicine because you feel better or because  your cholesterol numbers go down. Never stop taking your medicine unless your healthcare provider has told you its OK.  · Ask your healthcare provider if you have any questions about your medicines.  High risk groups  Some people may need to take medicines called statins to control their cholesterol. This is in addition to eating a healthy diet and getting regular exercise.  Statins can help you stay healthy. They can also help prevent a heart attack or stroke. You may need to take a statin if you are in one of these groups:  · Adults who have had a heart attack or stroke. Or adults who have had peripheral vascular disease, a ministroke (transient ischemic attack), or stable or unstable angina. This group also includes people who have had a procedure to restore blood flow through a blocked artery. These procedures include percutaneous coronary intervention, angioplasty, stent, and open-heart bypass surgery.  · Adults who have diabetes. Or adults who are at higher risk of having a heart attack or stroke and have an LDL cholesterol level of 70 to 189 mg/dL  · Adults who are 21 years old or older and have an LDL cholesterol level of 190 mg/dL or higher.  If you are in a high-risk group, talk with your healthcare provider about your treatment goals. Make sure you understand why these goals are important, based on your own health history and your family history of heart disease or high cholesterol.  Make a plan to have regular cholesterol checks. You may need to fast before getting this test. Also ask your provider about any side effects your medicines may cause. Let your provider know about any side effects you have. You may need to take more than one medicine to reach the cholesterol goals that you and your provider decide on.  Date Last Reviewed: 10/1/2016  © 0385-0433 [employer]. [address] [place] [address], [place] [address]. All rights reserved. This information is not intended as a substitute for  professional medical care. Always follow your healthcare professional's instructions.

## 2020-02-28 DIAGNOSIS — N52.9 ERECTILE DYSFUNCTION, UNSPECIFIED ERECTILE DYSFUNCTION TYPE: ICD-10-CM

## 2020-02-28 RX ORDER — TADALAFIL 10 MG/1
10 TABLET ORAL DAILY PRN
Qty: 10 TABLET | Refills: 5 | Status: SHIPPED | OUTPATIENT
Start: 2020-02-28 | End: 2021-02-27

## 2020-02-28 NOTE — TELEPHONE ENCOUNTER
----- Message from Dajuan Coon sent at 2/28/2020 11:35 AM CST -----  Contact: self [phone]  Patient would like refill of tadalafil (CIALIS) 10 MG tablet sent to [facility] - [address], [place] Please advise.

## 2020-03-05 ENCOUNTER — OFFICE VISIT (OUTPATIENT)
Dept: FAMILY MEDICINE | Facility: CLINIC | Age: 45
End: 2020-03-05
Payer: COMMERCIAL

## 2020-03-05 VITALS
OXYGEN SATURATION: 96 % | HEIGHT: 71 IN | TEMPERATURE: 100 F | DIASTOLIC BLOOD PRESSURE: 84 MMHG | WEIGHT: 213.63 LBS | BODY MASS INDEX: 29.91 KG/M2 | SYSTOLIC BLOOD PRESSURE: 116 MMHG | HEART RATE: 92 BPM

## 2020-03-05 DIAGNOSIS — R68.89 FLU-LIKE SYMPTOMS: Primary | ICD-10-CM

## 2020-03-05 DIAGNOSIS — B34.9 VIRAL ILLNESS: ICD-10-CM

## 2020-03-05 LAB
CTP QC/QA: YES
FLUAV AG NPH QL: NEGATIVE
FLUBV AG NPH QL: NEGATIVE

## 2020-03-05 PROCEDURE — 3008F PR BODY MASS INDEX (BMI) DOCUMENTED: ICD-10-PCS | Mod: CPTII,S$GLB,, | Performed by: NURSE PRACTITIONER

## 2020-03-05 PROCEDURE — 99999 PR PBB SHADOW E&M-EST. PATIENT-LVL IV: CPT | Mod: PBBFAC,,, | Performed by: NURSE PRACTITIONER

## 2020-03-05 PROCEDURE — 87804 INFLUENZA ASSAY W/OPTIC: CPT | Mod: QW,S$GLB,, | Performed by: NURSE PRACTITIONER

## 2020-03-05 PROCEDURE — 87804 POCT INFLUENZA A/B: ICD-10-PCS | Mod: QW,S$GLB,, | Performed by: NURSE PRACTITIONER

## 2020-03-05 PROCEDURE — 99213 PR OFFICE/OUTPT VISIT, EST, LEVL III, 20-29 MIN: ICD-10-PCS | Mod: 25,S$GLB,, | Performed by: NURSE PRACTITIONER

## 2020-03-05 PROCEDURE — 99999 PR PBB SHADOW E&M-EST. PATIENT-LVL IV: ICD-10-PCS | Mod: PBBFAC,,, | Performed by: NURSE PRACTITIONER

## 2020-03-05 PROCEDURE — 3008F BODY MASS INDEX DOCD: CPT | Mod: CPTII,S$GLB,, | Performed by: NURSE PRACTITIONER

## 2020-03-05 PROCEDURE — 99213 OFFICE O/P EST LOW 20 MIN: CPT | Mod: 25,S$GLB,, | Performed by: NURSE PRACTITIONER

## 2020-03-05 NOTE — PATIENT INSTRUCTIONS
Viral Upper Respiratory Illness (Adult)  You have a viral upper respiratory illness (URI), which is another term for the common cold. This illness is contagious during the first few days. It is spread through the air by coughing and sneezing. It may also be spread by direct contact (touching the sick person and then touching your own eyes, nose, or mouth). Frequent handwashing will decrease risk of spread. Most viral illnesses go away within 7 to 10 days with rest and simple home remedies. Sometimes the illness may last for several weeks. Antibiotics will not kill a virus, and they are generally not prescribed for this condition.    Home care  · If symptoms are severe, rest at home for the first 2 to 3 days. When you resume activity, don't let yourself get too tired.  · Avoid being exposed to cigarette smoke (yours or others).  · You may use acetaminophen or ibuprofen to control pain and fever, unless another medicine was prescribed. (Note: If you have chronic liver or kidney disease, have ever had a stomach ulcer or gastrointestinal bleeding, or are taking blood-thinning medicines, talk with your healthcare provider before using these medicines.) Aspirin should never be given to anyone under 18 years of age who is ill with a viral infection or fever. It may cause severe liver or brain damage.  · Your appetite may be poor, so a light diet is fine. Avoid dehydration by drinking 6 to 8 glasses of fluids per day (water, soft drinks, juices, tea, or soup). Extra fluids will help loosen secretions in the nose and lungs.  · Over-the-counter cold medicines will not shorten the length of time youre sick, but they may be helpful for the following symptoms: cough, sore throat, and nasal and sinus congestion. (Note: Do not use decongestants if you have high blood pressure.)  Follow-up care  Follow up with your healthcare provider, or as advised.  When to seek medical advice  Call your healthcare provider right away if any  of these occur:  · Cough with lots of colored sputum (mucus)  · Severe headache; face, neck, or ear pain  · Difficulty swallowing due to throat pain  · Fever of 100.4°F (38°C)  Call 911, or get immediate medical care  Call emergency services right away if any of these occur:  · Chest pain, shortness of breath, wheezing, or difficulty breathing  · Coughing up blood  · Inability to swallow due to throat pain  Date Last Reviewed: 9/13/2015  © 8620-5647 [employer]. [address] [place] [address]. All rights reserved. This information is not intended as a substitute for professional medical care. Always follow your healthcare professional's instructions.

## 2020-03-05 NOTE — LETTER
March 5, 2020      [address], [address]  [facility] [address]  Phone: [phone]  Fax: [phone]       Patient: Albert Escoto   YOB: 1975  Date of Visit: 03/05/2020    To Whom It May Concern:    Dea Escoto  was at [facility] on 03/05/2020. He may return to work/school on 03/06/2020 with no restrictions. If you have any questions or concerns, or if I can be of further assistance, please do not hesitate to contact me.    Sincerely,    AMI Crane

## 2020-03-05 NOTE — PROGRESS NOTES
Subjective:       Patient ID: Albert Escoto is a 45 y.o. male.    Chief Complaint: Illness (flu like sysptoms bodyaches and cough etc..)    46 y/o male presents to clinic for urgent care visit with c/o flu-like symptoms.     URI    This is a new problem. The current episode started today. The problem has been gradually worsening. There has been no fever. Associated symptoms include coughing, headaches, nausea and rhinorrhea. Pertinent negatives include no chest pain, congestion, sore throat, vomiting or wheezing. Associated symptoms comments: +myalgia. Treatments tried: [place] Cold. The treatment provided mild relief.       Current Outpatient Medications   Medication Sig Dispense Refill    tadalafil (CIALIS) 10 MG tablet Take 1 tablet (10 mg total) by mouth daily as needed for Erectile Dysfunction. 10 tablet 5     No current facility-administered medications for this visit.        History reviewed. No pertinent past medical history.    History reviewed. No pertinent surgical history.    Family History   Problem Relation Age of Onset    Brain cancer Mother     Lung disease Mother     No Known Problems Father     No Known Problems Sister     Diabetes Brother     No Known Problems Daughter     No Known Problems Son     No Known Problems Brother     No Known Problems Brother     No Known Problems Daughter     No Known Problems Daughter     No Known Problems Daughter     No Known Problems Son     No Known Problems Son     No Known Problems Son     No Known Problems Son     No Known Problems Son     No Known Problems Son        Social History     Socioeconomic History    Marital status:      Spouse name: Not on file    Number of children: Not on file    Years of education: Not on file    Highest education level: Not on file   Occupational History    Not on file   Social Needs    Financial resource strain: Not on file    Food insecurity:     Worry: Not on file     Inability: Not  "on file    Transportation needs:     Medical: Not on file     Non-medical: Not on file   Tobacco Use    Smoking status: Current Every Day Smoker     Packs/day: 1.00     Types: Cigarettes    Smokeless tobacco: Never Used   Substance and Sexual Activity    Alcohol use: Yes     Comment: occasional    Drug use: No    Sexual activity: Yes   Lifestyle    Physical activity:     Days per week: Not on file     Minutes per session: Not on file    Stress: Not on file   Relationships    Social connections:     Talks on phone: Not on file     Gets together: Not on file     Attends Mandaeism service: Not on file     Active member of club or organization: Not on file     Attends meetings of clubs or organizations: Not on file     Relationship status: Not on file   Other Topics Concern    Not on file   Social History Narrative    Not on file       Review of Systems   Constitutional: Positive for fatigue. Negative for fever and unexpected weight change.   HENT: Positive for rhinorrhea. Negative for congestion, postnasal drip and sore throat.    Respiratory: Positive for cough. Negative for shortness of breath and wheezing.    Cardiovascular: Negative for chest pain.   Gastrointestinal: Positive for nausea. Negative for vomiting.   Musculoskeletal: Positive for myalgias.   Allergic/Immunologic: Negative for environmental allergies and immunocompromised state.   Neurological: Positive for headaches.   Hematological: Negative for adenopathy.         Objective:     Vitals:    03/05/20 1338   BP: 116/84   Pulse: 92   Temp: 99.6 °F (37.6 °C)   TempSrc: Oral   SpO2: 96%   Weight: 96.9 kg (213 lb 10 oz)   Height: 5' 11" (1.803 m)          Physical Exam   Constitutional: He is oriented to person, place, and time. He appears well-developed and well-nourished. He appears ill. No distress.   HENT:   Head: Normocephalic.   Right Ear: Tympanic membrane and ear canal normal.   Left Ear: Tympanic membrane and ear canal normal.   Nose: " Nose normal.   Mouth/Throat: Uvula is midline and oropharynx is clear and moist.   Neck: Normal range of motion. Neck supple.   Cardiovascular: Normal rate and regular rhythm.   Pulmonary/Chest: Effort normal and breath sounds normal. He has no wheezes. He has no rales.   Musculoskeletal: Normal range of motion.   Lymphadenopathy:     He has no cervical adenopathy.   Neurological: He is alert and oriented to person, place, and time.   Skin: Skin is warm and dry.   Psychiatric: He has a normal mood and affect.         Assessment:         ICD-10-CM ICD-9-CM   1. Flu-like symptoms R68.89 780.99   2. Viral illness B34.9 079.99       Plan:       Flu-like symptoms  -     POCT Influenza A/B    Viral illness    Rapid influenza negative. Advise expectant management with saline nasal rinses, Tylenol or NSAIDS as needed for pain or fever, Mucinex DM as needed for cough, and pseudoephedrine +/-antihistamine as needed for congestion. Throat lozenges and warm salt water gargles as needed for sore throat.  Expect symptom resolution to be between 7-10 days from time of onset of symptoms.  If symptoms last beyond this timeframe or patient develops worsening headaches, purulent nasal discharge, and fever, call or return to the office for further recommendations.      Follow up if symptoms worsen or fail to improve.     Patient's Medications   New Prescriptions    No medications on file   Previous Medications    TADALAFIL (CIALIS) 10 MG TABLET    Take 1 tablet (10 mg total) by mouth daily as needed for Erectile Dysfunction.   Modified Medications    No medications on file   Discontinued Medications    No medications on file

## 2020-03-25 ENCOUNTER — TELEPHONE (OUTPATIENT)
Dept: FAMILY MEDICINE | Facility: CLINIC | Age: 45
End: 2020-03-25

## 2020-03-25 DIAGNOSIS — N52.9 ERECTILE DYSFUNCTION, UNSPECIFIED ERECTILE DYSFUNCTION TYPE: ICD-10-CM

## 2020-03-25 NOTE — TELEPHONE ENCOUNTER
Called pt's pharmacy in regards of needing a medication refill. The pharmacy informed me that pt has 5 refills remaining on his medication.

## 2020-03-25 NOTE — TELEPHONE ENCOUNTER
----- Message from Dajuan Coon sent at 3/25/2020  3:11 PM CDT -----  Contact: self [phone]  Patient would like refill of tadalafil (CIALIS) 10 MG tablet sent to [facility] - [address], [place] Please advise.

## 2020-03-25 NOTE — TELEPHONE ENCOUNTER
Called and informed patient that he still has 5 refills left on his medication. Pt verbalized understanding of message.

## 2021-05-06 ENCOUNTER — PATIENT MESSAGE (OUTPATIENT)
Dept: RESEARCH | Facility: HOSPITAL | Age: 46
End: 2021-05-06

## 2021-07-01 ENCOUNTER — PATIENT MESSAGE (OUTPATIENT)
Dept: ADMINISTRATIVE | Facility: OTHER | Age: 46
End: 2021-07-01

## 2021-07-19 DIAGNOSIS — U07.1 COVID-19 VIRUS DETECTED: ICD-10-CM

## 2022-02-08 ENCOUNTER — OFFICE VISIT (OUTPATIENT)
Dept: FAMILY MEDICINE | Facility: CLINIC | Age: 47
End: 2022-02-08
Payer: COMMERCIAL

## 2022-02-08 VITALS
WEIGHT: 214.88 LBS | HEART RATE: 74 BPM | OXYGEN SATURATION: 98 % | DIASTOLIC BLOOD PRESSURE: 84 MMHG | HEIGHT: 70 IN | BODY MASS INDEX: 30.76 KG/M2 | SYSTOLIC BLOOD PRESSURE: 140 MMHG

## 2022-02-08 DIAGNOSIS — Z00.00 GENERAL MEDICAL EXAM: ICD-10-CM

## 2022-02-08 DIAGNOSIS — M54.50 LOW BACK PAIN WITHOUT SCIATICA, UNSPECIFIED BACK PAIN LATERALITY, UNSPECIFIED CHRONICITY: ICD-10-CM

## 2022-02-08 DIAGNOSIS — M79.641 RIGHT HAND PAIN: ICD-10-CM

## 2022-02-08 DIAGNOSIS — E78.5 HYPERLIPIDEMIA, UNSPECIFIED HYPERLIPIDEMIA TYPE: Primary | ICD-10-CM

## 2022-02-08 DIAGNOSIS — M54.10 RADICULOPATHY, UNSPECIFIED SPINAL REGION: ICD-10-CM

## 2022-02-08 DIAGNOSIS — Z12.5 PROSTATE CANCER SCREENING: ICD-10-CM

## 2022-02-08 DIAGNOSIS — N52.9 ERECTILE DYSFUNCTION, UNSPECIFIED ERECTILE DYSFUNCTION TYPE: ICD-10-CM

## 2022-02-08 PROCEDURE — 99999 PR PBB SHADOW E&M-EST. PATIENT-LVL IV: ICD-10-PCS | Mod: PBBFAC,,, | Performed by: FAMILY MEDICINE

## 2022-02-08 PROCEDURE — 99999 PR PBB SHADOW E&M-EST. PATIENT-LVL IV: CPT | Mod: PBBFAC,,, | Performed by: FAMILY MEDICINE

## 2022-02-08 PROCEDURE — 99214 OFFICE O/P EST MOD 30 MIN: CPT | Mod: S$GLB,,, | Performed by: FAMILY MEDICINE

## 2022-02-08 PROCEDURE — 99214 PR OFFICE/OUTPT VISIT, EST, LEVL IV, 30-39 MIN: ICD-10-PCS | Mod: S$GLB,,, | Performed by: FAMILY MEDICINE

## 2022-02-08 RX ORDER — TADALAFIL 10 MG/1
10 TABLET ORAL DAILY PRN
Qty: 10 TABLET | Refills: 3 | Status: SHIPPED | OUTPATIENT
Start: 2022-02-08 | End: 2022-12-05 | Stop reason: SDUPTHER

## 2022-02-08 NOTE — PROGRESS NOTES
[facility] Note    Chief Complaint      Chief Complaint   Patient presents with    Low-back Pain    Hand Pain     numbness    Establish Care     History of Present Illness      Albert Escoto is a 46 y.o. male who presents today with:    Patient is new to me    Right hand tingling and Popping and getting stuck for years.  Worsening.  Patient states that every now and then he would get a tingling sensation on the top and bottom of his hands with all 5 fingers on the right side.  Lately in the past few months it has been more constant and at night it wakes him up with a sharp shooting pain.  Patient denies any neck injury or neck pain.  Patient denies any wrist or hand injury.  Patient does work with his hands and is right handed.    Lower back pain for years.  Patient denies any injury and states it is a chronic issue since he was a little kid.  Patient states that he had imaging of his back done and workup was all negative.  He does not have any shooting pains numbness tingling incontinence issues.  He just states a dull ache in his lower back bilaterally.  No spinal tenderness    Erectile dysfunction for 1 year.  Issues maintaining erectile.  He was on Cialis in the past and states that he was able to overcome this while working out over a year ago.  Patient is stop working out and feels the erectile dysfunction is back and he just cannot maintain full erection throughout intercourse.    Patient has hx of smoking cigarettes.  Stopped a year ago and does strawberry vape.  Denies anydrugs or drinking    Problem List Items Addressed This Visit        Cardiac/Vascular    Hyperlipidemia - Primary    Relevant Orders    Lipid Panel       Renal/    ED (erectile dysfunction)    Relevant Medications    tadalafiL (CIALIS) 10 MG tablet    Other Relevant Orders    Testosterone, Free    Testosterone      Other Visit Diagnoses     General medical exam        Relevant Orders    CBC Auto  Differential    Comprehensive Metabolic Panel    Hemoglobin A1C    Lipid Panel    PSA, Screening    TSH    Urinalysis    T4, Free    Testosterone, Free    Testosterone    Prostate cancer screening        Relevant Orders    PSA, Screening    Low back pain without sciatica, unspecified back pain laterality, unspecified chronicity        Relevant Orders    X-Ray Lumbar Complete Including Flex And Ext    Radiculopathy, unspecified spinal region        Relevant Orders    X-Ray Cervical Spine Complete 5 view    Right hand pain        Relevant Orders    Ambulatory referral/consult to Hand Surgery          Health Maintenance   Topic Date Due    Hepatitis C Screening  Never done    Lipid Panel  01/20/2025    TETANUS VACCINE  02/03/2030       History reviewed. No pertinent past medical history.    History reviewed. No pertinent surgical history.    family history includes Brain cancer in his mother; Diabetes in his brother; Lung disease in his mother; No Known Problems in his brother, brother, daughter, daughter, daughter, daughter, father, sister, son, son, son, son, son, son, and son.     Social History     Tobacco Use    Smoking status: Current Every Day Smoker     Packs/day: 1.00     Types: Cigarettes    Smokeless tobacco: Never Used   Substance Use Topics    Alcohol use: Yes     Comment: occasional    Drug use: No       Review of Systems   Constitutional: Negative for chills, fever, malaise/fatigue and weight loss.   HENT: Negative for congestion, ear discharge and ear pain.    Eyes: Negative for blurred vision.   Respiratory: Negative for cough and shortness of breath.    Cardiovascular: Negative for chest pain and leg swelling.   Gastrointestinal: Negative for abdominal pain, constipation, diarrhea and vomiting.   Genitourinary: Negative for dysuria.   Musculoskeletal: Positive for back pain and joint pain. Negative for myalgias.   Skin: Negative for rash.   Neurological: Positive for tingling. Negative for  "dizziness, focal weakness, weakness and headaches.   Endo/Heme/Allergies: Does not bruise/bleed easily.   Psychiatric/Behavioral: Negative for depression and suicidal ideas.        Outpatient Encounter Medications as of 2/8/2022   Medication Sig Dispense Refill    azithromycin (Z-GENI) 250 MG tablet 2 PO x 1 dose, then 1 PO Q day for 4 days (Patient not taking: Reported on 2/8/2022) 6 tablet 0    ibuprofen (ADVIL,MOTRIN) 800 MG tablet Take 1 tablet (800 mg total) by mouth every 6 (six) hours as needed for Pain. (Patient not taking: Reported on 2/8/2022) 20 tablet 0    tadalafil (CIALIS) 10 MG tablet Take 1 tablet (10 mg total) by mouth daily as needed for Erectile Dysfunction. 10 tablet 5    tadalafiL (CIALIS) 10 MG tablet Take 1 tablet (10 mg total) by mouth daily as needed for Erectile Dysfunction. 10 tablet 3     No facility-administered encounter medications on file as of 2/8/2022.       Review of patient's allergies indicates:  No Known Allergies    Physical Exam      Vital Signs  Pulse: 74  SpO2: 98 %  BP: (!) 140/84  Pain Score: 0-No pain  Height and Weight  Height: 5' 10" (177.8 cm)  Weight: 97.5 kg (214 lb 14.4 oz)  BSA (Calculated - sq m): 2.19 sq meters  BMI (Calculated): 30.8  Weight in (lb) to have BMI = 25: 173.9]    Physical Exam  Vitals reviewed.   Constitutional:       General: He is not in acute distress.     Appearance: Normal appearance. He is normal weight. He is not ill-appearing.   HENT:      Head: Normocephalic.      Right Ear: Tympanic membrane normal.      Left Ear: Tympanic membrane normal.      Nose: Nose normal.      Mouth/Throat:      Mouth: Mucous membranes are moist.      Pharynx: Oropharynx is clear.   Eyes:      Extraocular Movements: Extraocular movements intact.      Conjunctiva/sclera: Conjunctivae normal.      Pupils: Pupils are equal, round, and reactive to light.   Cardiovascular:      Rate and Rhythm: Normal rate and regular rhythm.      Pulses: Normal pulses.      Heart " sounds: Normal heart sounds.   Pulmonary:      Effort: Pulmonary effort is normal.      Breath sounds: Normal breath sounds.   Abdominal:      General: Abdomen is flat. Bowel sounds are normal. There is no distension.      Palpations: Abdomen is soft.      Tenderness: There is no abdominal tenderness.   Musculoskeletal:         General: Normal range of motion.      Cervical back: Normal range of motion and neck supple.   Skin:     General: Skin is warm and dry.      Capillary Refill: Capillary refill takes less than 2 seconds.      Findings: No rash.   Neurological:      General: No focal deficit present.      Mental Status: He is alert and oriented to person, place, and time.      Motor: No weakness.      Gait: Gait normal.   Psychiatric:         Mood and Affect: Mood normal.         Behavior: Behavior normal.         Thought Content: Thought content normal.         Judgment: Judgment normal.          Laboratory:  CBC:  No results for input(s): WBC, RBC, HGB, HCT, PLT, MCV, MCH, MCHC in the last 2160 hours.  CMP:  No results for input(s): GLU, CALCIUM, ALBUMIN, PROT, NA, K, CO2, CL, BUN, ALKPHOS, ALT, AST, BILITOT in the last 2160 hours.    Invalid input(s): CREATININ  URINALYSIS:  No results for input(s): COLORU, CLARITYU, SPECGRAV, PHUR, PROTEINUA, GLUCOSEU, BILIRUBINCON, BLOODU, WBCU, RBCU, BACTERIA, MUCUS, NITRITE, LEUKOCYTESUR, UROBILINOGEN, HYALINECASTS in the last 2160 hours.   LIPIDS:  No results for input(s): TSH, HDL, CHOL, TRIG, LDLCALC, CHOLHDL, NONHDLCHOL, TOTALCHOLEST in the last 2160 hours.  TSH:  No results for input(s): TSH in the last 2160 hours.  A1C:  No results for input(s): HGBA1C in the last 2160 hours.    Radiology:      Assessment/Plan     Albert Escoto is a 46 y.o.male with:    1. Hyperlipidemia, unspecified hyperlipidemia type  - Lipid Panel; Future    2. Erectile dysfunction, unspecified erectile dysfunction type  - Testosterone, Free; Future  - Testosterone; Future  - tadalafiL  (CIALIS) 10 MG tablet; Take 1 tablet (10 mg total) by mouth daily as needed for Erectile Dysfunction.  Dispense: 10 tablet; Refill: 3    3. General medical exam  - CBC Auto Differential; Future  - Comprehensive Metabolic Panel; Future  - Hemoglobin A1C; Future  - Lipid Panel; Future  - PSA, Screening; Future  - TSH; Future  - Urinalysis; Future  - T4, Free; Future  - Testosterone, Free; Future  - Testosterone; Future    4. Prostate cancer screening  - PSA, Screening; Future    5. Low back pain without sciatica, unspecified back pain laterality, unspecified chronicity  - X-Ray Lumbar Complete Including Flex And Ext; Future    6. Radiculopathy, unspecified spinal region  - X-Ray Cervical Spine Complete 5 view; Future    7. Right hand pain  - Ambulatory referral/consult to Hand Surgery; Future    Chronic conditions stable.  Will continue to monitor.     Follow up as discussed    If symptoms worsen or do not improve return to clinic, go to Urgent Care or ER  Take Medications as directed      -Continue current medications and maintain follow up with specialists.         David James Jr, MD  [facility] [place]

## 2022-02-11 ENCOUNTER — TELEPHONE (OUTPATIENT)
Dept: ORTHOPEDICS | Facility: CLINIC | Age: 47
End: 2022-02-11
Payer: COMMERCIAL

## 2022-02-11 DIAGNOSIS — M79.641 RIGHT HAND PAIN: Primary | ICD-10-CM

## 2022-02-11 NOTE — PROGRESS NOTES
"Subjective:      Patient ID: Albert Escoto is a 46 y.o. male.    Chief Complaint: No chief complaint on file.      HPI  (French)    Chronic pain/tingling in right hand x years taht is getting worse. He is right hand dominant. Symptoms are worse at night. He notes "snapping" in his fingers sometimes with tightness. No known injury. He rates his pain as a 3 on a scale of 1-10. Works at [employer] and uses his hands all day at work.     No OT, injections, bracing, or surgery on his right hand. No medications for this hand.     History reviewed. No pertinent past medical history.      Current Outpatient Medications:     tadalafiL (CIALIS) 10 MG tablet, Take 1 tablet (10 mg total) by mouth daily as needed for Erectile Dysfunction., Disp: 10 tablet, Rfl: 3    Review of patient's allergies indicates:  No Known Allergies    Review of Systems   Constitutional: Negative for fever, malaise/fatigue, night sweats, weight gain and weight loss.   HENT: Negative for hearing loss, nosebleeds and odynophagia.    Eyes: Negative for blurred vision and double vision.   Cardiovascular: Negative for chest pain, irregular heartbeat and palpitations.   Respiratory: Negative for cough, hemoptysis, shortness of breath and wheezing.    Endocrine: Negative for cold intolerance and polydipsia.   Hematologic/Lymphatic: Does not bruise/bleed easily.   Skin: Negative for dry skin, poor wound healing, rash and suspicious lesions.   Musculoskeletal:        See HPI for pertinent positives.   Gastrointestinal: Negative for bloating, abdominal pain, constipation, diarrhea, hematochezia, melena, nausea and vomiting.   Genitourinary: Negative for bladder incontinence, dysuria, hematuria, hesitancy and incomplete emptying.   Neurological: Negative for disturbances in coordination, dizziness, focal weakness, headaches, loss of balance, numbness, paresthesias, seizures and weakness.   Psychiatric/Behavioral: Negative for depression and " hallucinations. The patient is not nervous/anxious.          Objective:        /74   Wt 96.2 kg (212 lb)   BMI 30.42 kg/m²     General    Vitals reviewed.  Constitutional: He is oriented to person, place, and time. He appears well-developed and well-nourished.   Pulmonary/Chest: Effort normal.   Abdominal: He exhibits no distension.   Neurological: He is alert and oriented to person, place, and time.   Psychiatric: He has a normal mood and affect. His behavior is normal. Judgment and thought content normal.           RIGHT Hand/Wrist Examination:    Observation/Inspection:  Swelling  none    Deformity  none  Discoloration  none     Scars   Right thumb    Atrophy  none    HAND/WRIST EXAMINATION:  Finkelstein's Test   Neg  WHAT Test    Neg  Snuff box tenderness   Neg  Hook of Hamate Tenderness  Neg  CMC grind    Neg    Neurovascular Exam:  Digits WWP, brisk CR < 3s throughout  NVI motor/LTS to M/R/U nerves, radial pulse 2+  Tinel's Test - Carpal Tunnel  neg  Tinel's Test - Cubital Tunnel  Neg  Phalen's Test    positive  Median Nerve Compression Test positive    ROM hand/wrist/elbow full, painless    He has some stiffness of right ring and small finger. No triggering noted. No tenderness over A1 pulley.       LEFT Hand/Wrist Examination:    Observation/Inspection:  Swelling  none    Deformity  none  Discoloration  none     Scars   none    Atrophy  none    HAND/WRIST EXAMINATION:  Finkelstein's Test   Neg  WHAT Test    Neg  Snuff box tenderness   Neg  Hook of Hamate Tenderness  Neg  CMC grind    Neg    Neurovascular Exam:  Digits WWP, brisk CR < 3s throughout  NVI motor/LTS to M/R/U nerves, radial pulse 2+  Tinel's Test - Carpal Tunnel  Neg  Tinel's Test - Cubital Tunnel  Neg  Phalen's Test    Neg  Median Nerve Compression Test Neg    ROM hand/wrist/elbow full, painless      XRAY INTERPRETATION:   X-rays of right hand dated 2/16/22 are personally reviewed and show no fracture or dislocation.         Assessment:        Encounter Diagnoses   Name Primary?    Right hand pain Yes    Numbness and tingling in right hand           Plan:       Diagnoses and all orders for this visit:    Right hand pain  -     Ambulatory referral/consult to Hand Surgery  -     EMG W/ ULTRASOUND AND NERVE CONDUCTION TEST 2 Extremities; Future    Numbness and tingling in right hand  -     EMG W/ ULTRASOUND AND NERVE CONDUCTION TEST 2 Extremities; Future      Chronic pain/tingling in right hand x years that is getting worse. He is right hand dominant. Symptoms are worse at night. No known injury.    Right hand XRs show no fracture or dislocation. Exam/symptoms suspicious for carpal tunnel syndrome.     Treatment options reviewed with patient along with above right hand xrays. Following plan made:     - Given gel wrist brace to wear at night. Can wear during the day as needed.   - EMG/NCS of bilateral UEs to further evaluate right UE numbness/tingling.   - He will follow up after EMG to discuss results and further recommendations.     Follow up in about 2 months (around 4/16/2022).

## 2022-02-11 NOTE — TELEPHONE ENCOUNTER
----- Message from Belinda Alfred PA-C sent at 2/11/2022  8:28 AM CST -----  He has appt on Wednesday and needs right hand XRs prior to seeing me.     Thanks.

## 2022-02-16 ENCOUNTER — OFFICE VISIT (OUTPATIENT)
Dept: ORTHOPEDICS | Facility: CLINIC | Age: 47
End: 2022-02-16
Payer: COMMERCIAL

## 2022-02-16 VITALS — WEIGHT: 212 LBS | DIASTOLIC BLOOD PRESSURE: 74 MMHG | SYSTOLIC BLOOD PRESSURE: 124 MMHG | BODY MASS INDEX: 30.42 KG/M2

## 2022-02-16 DIAGNOSIS — M79.641 RIGHT HAND PAIN: Primary | ICD-10-CM

## 2022-02-16 DIAGNOSIS — R20.0 NUMBNESS AND TINGLING IN RIGHT HAND: ICD-10-CM

## 2022-02-16 DIAGNOSIS — R20.2 NUMBNESS AND TINGLING IN RIGHT HAND: ICD-10-CM

## 2022-02-16 PROCEDURE — 99203 OFFICE O/P NEW LOW 30 MIN: CPT | Mod: S$GLB,,, | Performed by: PHYSICIAN ASSISTANT

## 2022-02-16 PROCEDURE — 99203 PR OFFICE/OUTPT VISIT, NEW, LEVL III, 30-44 MIN: ICD-10-PCS | Mod: S$GLB,,, | Performed by: PHYSICIAN ASSISTANT

## 2022-02-16 PROCEDURE — 99999 PR PBB SHADOW E&M-EST. PATIENT-LVL III: ICD-10-PCS | Mod: PBBFAC,,, | Performed by: PHYSICIAN ASSISTANT

## 2022-02-16 PROCEDURE — 99999 PR PBB SHADOW E&M-EST. PATIENT-LVL III: CPT | Mod: PBBFAC,,, | Performed by: PHYSICIAN ASSISTANT

## 2022-02-16 NOTE — PATIENT INSTRUCTIONS
It was nice to meet you today! I am sorry that you are hurting so much.     Your right hand xrays look okay. I think your symptoms may be from carpal tunnel syndrome.     I want to get a nerve test to look into this further.     Wear the wrist brace at night. Can wear during the day as needed.      I will see you back in after the nerve test, but please stay in touch and call me if you need anything. You can also send me a message in [facility].     Belinda   [phone]

## 2022-04-05 ENCOUNTER — TELEPHONE (OUTPATIENT)
Dept: ORTHOPEDICS | Facility: CLINIC | Age: 47
End: 2022-04-05
Payer: COMMERCIAL

## 2022-04-05 NOTE — TELEPHONE ENCOUNTER
Left message for pt indicating that we should reschedule tomorrow's appointment since the purpose of the appointment is EMG review and his EMG was not done. I left my call back number and asked him to call me to reschedule EMG as well as reschedule appt.

## 2022-04-13 ENCOUNTER — TELEPHONE (OUTPATIENT)
Dept: ORTHOPEDICS | Facility: CLINIC | Age: 47
End: 2022-04-13
Payer: COMMERCIAL

## 2022-04-13 NOTE — TELEPHONE ENCOUNTER
----- Message from Belinda Alfred PA-C sent at 4/13/2022  3:55 PM CDT -----  He had appt for EMG today and it looks like he did not show up.     He sees me tomorrow to review EMG.     Please call him and reschedule EMG and f/u with me.     Thanks.

## 2022-12-05 ENCOUNTER — OFFICE VISIT (OUTPATIENT)
Dept: FAMILY MEDICINE | Facility: CLINIC | Age: 47
End: 2022-12-05
Payer: COMMERCIAL

## 2022-12-05 VITALS
SYSTOLIC BLOOD PRESSURE: 156 MMHG | DIASTOLIC BLOOD PRESSURE: 98 MMHG | HEIGHT: 70 IN | BODY MASS INDEX: 32.82 KG/M2 | WEIGHT: 229.25 LBS | HEART RATE: 87 BPM | OXYGEN SATURATION: 98 %

## 2022-12-05 DIAGNOSIS — Z23 FLU VACCINE NEED: ICD-10-CM

## 2022-12-05 DIAGNOSIS — R73.03 PREDIABETES: ICD-10-CM

## 2022-12-05 DIAGNOSIS — R03.0 ELEVATED BLOOD PRESSURE READING: ICD-10-CM

## 2022-12-05 DIAGNOSIS — Z12.12 SCREENING FOR COLORECTAL CANCER: ICD-10-CM

## 2022-12-05 DIAGNOSIS — H61.23 BILATERAL IMPACTED CERUMEN: ICD-10-CM

## 2022-12-05 DIAGNOSIS — Z11.4 ENCOUNTER FOR SCREENING FOR HIV: ICD-10-CM

## 2022-12-05 DIAGNOSIS — Z12.11 SCREENING FOR COLORECTAL CANCER: ICD-10-CM

## 2022-12-05 DIAGNOSIS — Z23 NEED FOR PNEUMOCOCCAL VACCINE: ICD-10-CM

## 2022-12-05 DIAGNOSIS — E78.00 PURE HYPERCHOLESTEROLEMIA: ICD-10-CM

## 2022-12-05 DIAGNOSIS — N52.9 ERECTILE DYSFUNCTION, UNSPECIFIED ERECTILE DYSFUNCTION TYPE: ICD-10-CM

## 2022-12-05 DIAGNOSIS — Z11.59 NEED FOR HEPATITIS C SCREENING TEST: ICD-10-CM

## 2022-12-05 PROCEDURE — 90472 IMMUNIZATION ADMIN EACH ADD: CPT | Mod: S$GLB,,, | Performed by: STUDENT IN AN ORGANIZED HEALTH CARE EDUCATION/TRAINING PROGRAM

## 2022-12-05 PROCEDURE — 90677 PCV20 VACCINE IM: CPT | Mod: S$GLB,,, | Performed by: STUDENT IN AN ORGANIZED HEALTH CARE EDUCATION/TRAINING PROGRAM

## 2022-12-05 PROCEDURE — 90471 FLU VACCINE (QUAD) GREATER THAN OR EQUAL TO 3YO PRESERVATIVE FREE IM: ICD-10-PCS | Mod: S$GLB,,, | Performed by: STUDENT IN AN ORGANIZED HEALTH CARE EDUCATION/TRAINING PROGRAM

## 2022-12-05 PROCEDURE — 90471 IMMUNIZATION ADMIN: CPT | Mod: S$GLB,,, | Performed by: STUDENT IN AN ORGANIZED HEALTH CARE EDUCATION/TRAINING PROGRAM

## 2022-12-05 PROCEDURE — 99203 PR OFFICE/OUTPT VISIT, NEW, LEVL III, 30-44 MIN: ICD-10-PCS | Mod: 25,S$GLB,, | Performed by: STUDENT IN AN ORGANIZED HEALTH CARE EDUCATION/TRAINING PROGRAM

## 2022-12-05 PROCEDURE — 90677 PNEUMOCOCCAL CONJUGATE VACCINE 20-VALENT: ICD-10-PCS | Mod: S$GLB,,, | Performed by: STUDENT IN AN ORGANIZED HEALTH CARE EDUCATION/TRAINING PROGRAM

## 2022-12-05 PROCEDURE — 99999 PR PBB SHADOW E&M-EST. PATIENT-LVL III: CPT | Mod: PBBFAC,,, | Performed by: STUDENT IN AN ORGANIZED HEALTH CARE EDUCATION/TRAINING PROGRAM

## 2022-12-05 PROCEDURE — 99203 OFFICE O/P NEW LOW 30 MIN: CPT | Mod: 25,S$GLB,, | Performed by: STUDENT IN AN ORGANIZED HEALTH CARE EDUCATION/TRAINING PROGRAM

## 2022-12-05 PROCEDURE — 90686 IIV4 VACC NO PRSV 0.5 ML IM: CPT | Mod: S$GLB,,, | Performed by: STUDENT IN AN ORGANIZED HEALTH CARE EDUCATION/TRAINING PROGRAM

## 2022-12-05 PROCEDURE — 99999 PR PBB SHADOW E&M-EST. PATIENT-LVL III: ICD-10-PCS | Mod: PBBFAC,,, | Performed by: STUDENT IN AN ORGANIZED HEALTH CARE EDUCATION/TRAINING PROGRAM

## 2022-12-05 PROCEDURE — 90686 FLU VACCINE (QUAD) GREATER THAN OR EQUAL TO 3YO PRESERVATIVE FREE IM: ICD-10-PCS | Mod: S$GLB,,, | Performed by: STUDENT IN AN ORGANIZED HEALTH CARE EDUCATION/TRAINING PROGRAM

## 2022-12-05 PROCEDURE — 90472 PNEUMOCOCCAL CONJUGATE VACCINE 20-VALENT: ICD-10-PCS | Mod: S$GLB,,, | Performed by: STUDENT IN AN ORGANIZED HEALTH CARE EDUCATION/TRAINING PROGRAM

## 2022-12-05 RX ORDER — TADALAFIL 10 MG/1
10 TABLET ORAL DAILY PRN
Qty: 10 TABLET | Refills: 3 | Status: SHIPPED | OUTPATIENT
Start: 2022-12-05 | End: 2023-03-18 | Stop reason: SDUPTHER

## 2022-12-05 RX ORDER — ATORVASTATIN CALCIUM 20 MG/1
20 TABLET, FILM COATED ORAL DAILY
Qty: 90 TABLET | Refills: 3 | Status: SHIPPED | OUTPATIENT
Start: 2022-12-05 | End: 2023-12-05

## 2022-12-05 NOTE — PROGRESS NOTES
[facility] Note    Chief Complaint      Chief Complaint   Patient presents with    Establish Care    Follow-up     Med refill     Hand Pain     Numbness in Rt hand      History of Present Illness      HPI    Albert Escoto is a 47 y.o. male with h/o HLD and ED for establishment of care and refill of medication. He endorses no new complaints today. He is however yet to establish healthy eating habits and daily exercise routine.    Problem List Addressed This Visit:  1. Elevated blood pressure reading    2. Pure hypercholesterolemia  -     LIPID PANEL; Future; Expected date: 12/05/2022  -     atorvastatin (LIPITOR) 20 MG tablet; Take 1 tablet (20 mg total) by mouth once daily.  Dispense: 90 tablet; Refill: 3    3. Prediabetes  -     HEMOGLOBIN A1C; Future; Expected date: 12/05/2022    4. Bilateral impacted cerumen  -     carbamide peroxide 6.5 % otic solution 5 drop    5. Erectile dysfunction, unspecified erectile dysfunction type  -     tadalafiL (CIALIS) 10 MG tablet; Take 1 tablet (10 mg total) by mouth daily as needed for Erectile Dysfunction.  Dispense: 10 tablet; Refill: 3    6. Need for hepatitis C screening test  -     Hepatitis C Antibody; Future; Expected date: 12/05/2022    7. Screening for colorectal cancer  -     Case Request Endoscopy: COLONOSCOPY    8. Need for pneumococcal vaccine  -     (In Office Administered) Pneumococcal Conjugate Vaccine (20 Valent) (IM)    9. Flu vaccine need  -     Influenza - Quadrivalent *Preferred* (6 months+) (PF)    10. Encounter for screening for HIV  -     HIV 1/2 Ag/Ab (4th Gen); Future; Expected date: 12/05/2022       Health Maintenance   Topic Date Due    Hepatitis C Screening  Never done    Lipid Panel  02/08/2027    TETANUS VACCINE  02/03/2030       History reviewed. No pertinent past medical history.    History reviewed. No pertinent surgical history.    family history includes Brain cancer in his mother; Diabetes in his brother; Lung  "disease in his mother; No Known Problems in his brother, brother, daughter, daughter, daughter, daughter, father, sister, son, son, son, son, son, son, and son.    Social History     Tobacco Use    Smoking status: Every Day     Packs/day: 1.00     Types: Cigarettes    Smokeless tobacco: Never   Substance Use Topics    Alcohol use: Yes     Comment: occasional    Drug use: No       Review of Systems   Constitutional:  Negative for chills, fatigue and fever.   Respiratory:  Negative for cough and shortness of breath.    Cardiovascular:  Negative for chest pain, palpitations and leg swelling.   Genitourinary:  Negative for dysuria, flank pain and frequency.   Musculoskeletal:  Negative for joint swelling.     Outpatient Encounter Medications as of 12/5/2022   Medication Sig Dispense Refill    [DISCONTINUED] tadalafiL (CIALIS) 10 MG tablet Take 1 tablet (10 mg total) by mouth daily as needed for Erectile Dysfunction. 10 tablet 3    atorvastatin (LIPITOR) 20 MG tablet Take 1 tablet (20 mg total) by mouth once daily. 90 tablet 3    tadalafiL (CIALIS) 10 MG tablet Take 1 tablet (10 mg total) by mouth daily as needed for Erectile Dysfunction. 10 tablet 3     Facility-Administered Encounter Medications as of 12/5/2022   Medication Dose Route Frequency Provider Last Rate Last Admin    carbamide peroxide 6.5 % otic solution 5 drop  5 drop Both Ears 1 time in Clinic/HOD Salima Cummings MD            Review of patient's allergies indicates:  No Known Allergies    Physical Exam      Vital Signs  Pulse: 87  SpO2: 98 %  BP: (!) 156/98  BP Location: Left arm  Patient Position: Sitting  Pain Score: 0-No pain  Height and Weight  Height: 5' 10" (177.8 cm)  Weight: 104 kg (229 lb 4.5 oz)  BSA (Calculated - sq m): 2.27 sq meters  BMI (Calculated): 32.9  Weight in (lb) to have BMI = 25: 173.9]    Physical Exam  Vitals reviewed.   Constitutional:       Appearance: He is obese.   HENT:      Head: Normocephalic and atraumatic.      " Right Ear: There is impacted cerumen.      Left Ear: There is impacted cerumen.      Mouth/Throat:      Mouth: Mucous membranes are moist.   Eyes:      Extraocular Movements: Extraocular movements intact.      Pupils: Pupils are equal, round, and reactive to light.   Cardiovascular:      Rate and Rhythm: Normal rate and regular rhythm.      Pulses: Normal pulses.   Pulmonary:      Effort: Pulmonary effort is normal.      Breath sounds: Normal breath sounds.   Skin:     General: Skin is warm.   Neurological:      General: No focal deficit present.      Mental Status: He is alert.   Psychiatric:         Mood and Affect: Mood normal.        Laboratory:  CBC:  No results for input(s): WBC, RBC, HGB, HCT, PLT, MCV, MCH, MCHC in the last 2160 hours.  CMP:  No results for input(s): GLU, CALCIUM, ALBUMIN, PROT, NA, K, CO2, CL, BUN, ALKPHOS, ALT, AST, BILITOT in the last 2160 hours.    Invalid input(s): CREATININ  URINALYSIS:  No results for input(s): COLORU, CLARITYU, SPECGRAV, PHUR, PROTEINUA, GLUCOSEU, BILIRUBINCON, BLOODU, WBCU, RBCU, BACTERIA, MUCUS, NITRITE, LEUKOCYTESUR, UROBILINOGEN, HYALINECASTS in the last 2160 hours.   LIPIDS:  No results for input(s): TSH, HDL, CHOL, TRIG, LDLCALC, CHOLHDL, NONHDLCHOL, TOTALCHOLEST in the last 2160 hours.  TSH:  No results for input(s): TSH in the last 2160 hours.  A1C:  No results for input(s): HGBA1C in the last 2160 hours.    Radiology:      Assessment/Plan     Albert Escoto is a 47 y.o.male with:    1. Elevated blood pressure reading  -Office readings >150/90s  -counseled extensively on life style modifications  -Keep BP log  -will start regimen if no improvement in 3 months    2. Pure hypercholesterolemia  - LIPID PANEL; Future  - increase to atorvastatin (LIPITOR) 20 MG tablet; Take 1 tablet (20 mg total) by mouth once daily.  Dispense: 90 tablet; Refill: 3    3. Prediabetes  -Last HbA1c elevated @ 5.7 (2/2022)  -in the setting of metabolic syndrome will repeat Hba1C  again  - HEMOGLOBIN A1C; Future  -c/w life style modifications    4. Bilateral impacted cerumen  - carbamide peroxide 6.5 % otic solution 5 drop    5. Erectile dysfunction, unspecified erectile dysfunction type  - tadalafiL (CIALIS) 10 MG tablet; Take 1 tablet (10 mg total) by mouth daily as needed for Erectile Dysfunction.  Dispense: 10 tablet; Refill: 3    6. Need for hepatitis C screening test  - Hepatitis C Antibody; Future    7. Screening for colorectal cancer  - Case Request Endoscopy: COLONOSCOPY    8. Need for pneumococcal vaccine  - (In Office Administered) Pneumococcal Conjugate Vaccine (20 Valent) (IM)    9. Flu vaccine need  - Influenza - Quadrivalent *Preferred* (6 months+) (PF)    10. Encounter for screening for HIV  - HIV 1/2 Ag/Ab (4th Gen); Future    11. BMI 32  -motivated to start life style modification, target weight loss of 5% against next clinic visit      Patient verbalizes understanding and agrees with current treatment plan.      Salima Cummings MD  [facility] [place]

## 2022-12-07 ENCOUNTER — PATIENT MESSAGE (OUTPATIENT)
Dept: GASTROENTEROLOGY | Facility: CLINIC | Age: 47
End: 2022-12-07
Payer: COMMERCIAL

## 2022-12-07 ENCOUNTER — TELEPHONE (OUTPATIENT)
Dept: GASTROENTEROLOGY | Facility: CLINIC | Age: 47
End: 2022-12-07
Payer: COMMERCIAL

## 2022-12-07 NOTE — TELEPHONE ENCOUNTER
Referring Physician: Dr. Salima Cummings                             Date: 12/7/2022    Reason for Referral: Screening colonoscopy      Family History of:   Colon polyp: No  Relationship/Age of Onset:       Colon cancer: No  Relationship/Age of Onset:       Patient with:   Hemoccults Done:       Iron deficient:   No      On Blood Thinner: No      Valvular heart disease/valve replacement: No      Anemia Present: No      On NSAID: No    On Adipex or phentermine:No     On Ozempic: no     Lung disease: no      Kidney disease: no     Hx of Crohn's or Ulcerative colitis: no     Hx of polyps:       Hx of colon cancer:       Previous colon evalations: First colonoscopy  When:   Where:   Pertinent symptoms:           Review of patient's allergies indicates: NKDA        Patient was scheduled for colonoscopy on  12/29/2022      with Dr. Morales at [facility].       instructions were reviewed with patient.        Prep sent to [facility] in [place]      SUPREP Instructions    You are scheduled for a colonoscopy with Dr. Morales on 12/29/2022 at [facility]. Enter through the [facility] Entrance and check in at [facility].  To ensure that your test is accurate and complete, you MUST follow these instructions listed below.  If you have any questions, please call our office at [phone].  Plan on being at the hospital for your procedure for 3-4 hours.      IF YOU HAVE ANY QUESTIONS ABOUT YOUR ARRIVAL TIME YOU CAN CALL [phone].    1.  Follow a CLEAR LIQUID DIET for the entire day before your scheduled colonoscopy.  This means no solid food the entire day starting when you wake.  You may have as much of the clear liquids as you want throughout the day.   CLEAR LIQUID DIET:      - NO DAIRY   - You can have:  Coffee with sugar (no creamer), tea, water, soda, apple or white grape juice, chicken or beef broth/bouillon (no meat, noodles, or veggies),  popsicles, , lemonade.    2.  AT 5 pm the evening before your  colonoscopy, POUR ONE (1) BOTTLE OF SUPREP INTO THE MIXING CONTAINER, PROVIDED INSIDE THE BOX.  ADD WATER TO THE LINE ON THE CONTAINER AND MIX IT WELL.  DRINK THE ENTIRE CONTAINER AND THEN DRINK TWO (2) MORE CONTAINERS OF WATER OVER THE NEXT 1 HOUR.  This is sometimes easier to drink if this solution is cold, so you can mix the solution 20 minutes ahead of time and place in the refrigerator prior to drinking.  You have to drink the solution within 30-45 minutes of mixing it.  Do NOT put this solution over ice.  It IS ok to drink with a straw.    3.  The endoscopy department will call you 1 day before your colonoscopy to tell you the exact time to arrive, AND to tell you the exact time to drink the 2nd portion of your prep (which will be FIVE HOURS BEFORE YOUR ARRIVAL TIME).  At this time given to you, POUR ONE (1) BOTTLE OF SUPREP INTO THE MIXING CONTAINER, PROVIDED INSIDE THE BOX.  ADD WATER TO THE LINE ON THE CONTAINER AND MIX IT WELL.  DRINK THE ENTIRE CONTAINER AND THEN DRINK TWO (2) MORE CONTAINERS OF WATER OVER THE NEXT 1 HOUR.  This is sometimes easier to drink if this solution is cold, so you can mix the solution 20 minutes ahead of time and place in the refrigerator prior to drinking.  You have to drink the solution within 30-45 minutes of mixing it.  Do NOT put this solution over ice.  It IS ok to drink with a straw.  Once this is complete, you may not have ANYTHING else by mouth!    4.  You must have someone with you to DRIVE YOU HOME since you will be receiving IV sedation for the colonoscopy.    5.  It is ok to take MOST of your REGULAR MEDICATIONS  in the morning of your test with a SIP of water.  THE ONLY MEDS YOU NEED TO HOLD ARE YOUR DIABETES MEDICATIONS,  SOME BLOOD PRESSURE MEDS, AND BLOOD THINNERS IF OK'D BY YOUR DOCTOR.  Do NOT have anything else to eat or drink the morning of your colonoscopy.  It is ok to brush your teeth.    6.  If you are on blood thinners THAT YOU HAVE BEEN INSTRUCTED TO  HOLD BY YOUR DOCTOR FOR THIS PROCEDURE, then do NOT take this the morning of your colonoscopy.  Do NOT stop these medications on your own, they must be approved to be held by your doctor.  Your colonoscopy can NOT be done if you are on these medications.  Examples of blood thinners include: Coumadin, Aggrenox, Plavix, Pradaxa, Reapro, Pletal, Xarelto, Ticagrelor, Brilinta, Eliquis, and high dose aspirin (325 mg).  You do not have to stop baby aspirin 81 mg.    7.  IF YOU ARE DIABETIC:  NO INSULIN OR ORAL MEDICATIONS THE MORNING OF THE COLONOSCOPY.  TAKE ONLY HALF THE DOSE OF YOUR INSULIN THE DAY BEFORE THE COLONOSCOPY.  DO NOT TAKE ANY ORAL DIABETIC MEDICATIONS THE DAY BEFORE THE COLONOSCOPY.  IF YOU ARE AN INSULIN DEPENDENT DIABETIC WITH UNSTABLE BLOOD SUGARS, NOTIFY YOUR PRIMARY CARE PHYSICIAN FOR INSTRUCTIONS.

## 2022-12-08 RX ORDER — SODIUM, POTASSIUM,MAG SULFATES 17.5-3.13G
1 SOLUTION, RECONSTITUTED, ORAL ORAL DAILY
Qty: 1 KIT | Refills: 0 | Status: SHIPPED | OUTPATIENT
Start: 2022-12-08 | End: 2022-12-10

## 2023-01-20 ENCOUNTER — PATIENT OUTREACH (OUTPATIENT)
Dept: ADMINISTRATIVE | Facility: HOSPITAL | Age: 48
End: 2023-01-20
Payer: COMMERCIAL

## 2023-01-20 NOTE — PROGRESS NOTES
Care Everywhere updates requested and reviewed.  Immunizations reconciled. Media reports reviewed.  Duplicate HM overrides and  orders removed.  Overdue HM topic chart audit and/or requested.  Overdue lab testing linked to upcoming lab appointments if applies.      Health Maintenance Due   Topic Date Due    Colorectal Cancer Screening  Never done

## 2023-02-03 ENCOUNTER — OFFICE VISIT (OUTPATIENT)
Dept: FAMILY MEDICINE | Facility: CLINIC | Age: 48
End: 2023-02-03
Payer: COMMERCIAL

## 2023-02-03 VITALS
WEIGHT: 212.81 LBS | SYSTOLIC BLOOD PRESSURE: 138 MMHG | HEART RATE: 82 BPM | DIASTOLIC BLOOD PRESSURE: 85 MMHG | OXYGEN SATURATION: 98 % | HEIGHT: 70 IN | BODY MASS INDEX: 30.47 KG/M2

## 2023-02-03 DIAGNOSIS — I10 ESSENTIAL HYPERTENSION: Primary | ICD-10-CM

## 2023-02-03 DIAGNOSIS — Z12.12 ENCOUNTER FOR COLORECTAL CANCER SCREENING: ICD-10-CM

## 2023-02-03 DIAGNOSIS — E78.00 PURE HYPERCHOLESTEROLEMIA: ICD-10-CM

## 2023-02-03 DIAGNOSIS — Z00.00 ANNUAL PHYSICAL EXAM: ICD-10-CM

## 2023-02-03 DIAGNOSIS — G43.109 MIGRAINE WITH AURA AND WITHOUT STATUS MIGRAINOSUS, NOT INTRACTABLE: ICD-10-CM

## 2023-02-03 DIAGNOSIS — I10 ESSENTIAL HYPERTENSION: ICD-10-CM

## 2023-02-03 DIAGNOSIS — Z12.11 ENCOUNTER FOR COLORECTAL CANCER SCREENING: ICD-10-CM

## 2023-02-03 DIAGNOSIS — Z13.228 ENCOUNTER FOR SCREENING FOR METABOLIC DISORDER: ICD-10-CM

## 2023-02-03 PROCEDURE — 99214 OFFICE O/P EST MOD 30 MIN: CPT | Mod: S$GLB,,, | Performed by: STUDENT IN AN ORGANIZED HEALTH CARE EDUCATION/TRAINING PROGRAM

## 2023-02-03 PROCEDURE — 99214 PR OFFICE/OUTPT VISIT, EST, LEVL IV, 30-39 MIN: ICD-10-PCS | Mod: S$GLB,,, | Performed by: STUDENT IN AN ORGANIZED HEALTH CARE EDUCATION/TRAINING PROGRAM

## 2023-02-03 PROCEDURE — 99999 PR PBB SHADOW E&M-EST. PATIENT-LVL III: CPT | Mod: PBBFAC,,, | Performed by: STUDENT IN AN ORGANIZED HEALTH CARE EDUCATION/TRAINING PROGRAM

## 2023-02-03 PROCEDURE — 99999 PR PBB SHADOW E&M-EST. PATIENT-LVL III: ICD-10-PCS | Mod: PBBFAC,,, | Performed by: STUDENT IN AN ORGANIZED HEALTH CARE EDUCATION/TRAINING PROGRAM

## 2023-02-03 RX ORDER — PROPRANOLOL HYDROCHLORIDE 40 MG/1
40 TABLET ORAL 2 TIMES DAILY
Qty: 180 TABLET | Refills: 1 | Status: SHIPPED | OUTPATIENT
Start: 2023-02-03 | End: 2023-02-03

## 2023-02-03 RX ORDER — PROPRANOLOL HYDROCHLORIDE 40 MG/1
40 TABLET ORAL 2 TIMES DAILY
Qty: 180 TABLET | Refills: 1 | Status: SHIPPED | OUTPATIENT
Start: 2023-02-03 | End: 2024-02-03

## 2023-02-03 NOTE — PROGRESS NOTES
[facility]    Chief Complaint      Chief Complaint   Patient presents with    Follow-up     8 week      History of Present Illness      Follow-up  Pertinent negatives include no chest pain, chills, coughing, fatigue, fever or joint swelling.     Albert Escoto is a 47 y.o. male with HLD , elevated BP readings and ED for follow up on BP readings. He still does not check his BP at home . He c/o intermittent dull aching left sided headaches in the past year that worsens with bright light and noise unresponsive to OTC medications. He has improved his establish healthy eating habits and daily exercise routine.    Problem List Addressed This Visit:    1. Essential hypertension  -     Discontinue: propranoloL (INDERAL) 40 MG tablet; Take 1 tablet (40 mg total) by mouth 2 (two) times daily.  Dispense: 180 tablet; Refill: 1  -     propranoloL (INDERAL) 40 MG tablet; Take 1 tablet (40 mg total) by mouth 2 (two) times daily.  Dispense: 180 tablet; Refill: 1    2. Migraine with aura and without status migrainosus, not intractable  -     Discontinue: propranoloL (INDERAL) 40 MG tablet; Take 1 tablet (40 mg total) by mouth 2 (two) times daily.  Dispense: 180 tablet; Refill: 1  -     propranoloL (INDERAL) 40 MG tablet; Take 1 tablet (40 mg total) by mouth 2 (two) times daily.  Dispense: 180 tablet; Refill: 1    3. Encounter for colorectal cancer screening  -     Case Request Endoscopy: COLONOSCOPY    4. BMI 30.0-30.9,adult         Health Maintenance   Topic Date Due    Lipid Panel  12/05/2027    TETANUS VACCINE  02/03/2030    Hepatitis C Screening  Completed       Past Medical History:   Diagnosis Date    Hypercholesteremia     Hypertension     Male erectile dysfunction, unspecified        History reviewed. No pertinent surgical history.    family history includes Brain cancer in his mother; Diabetes in his brother; Lung disease in his mother; No Known Problems in his brother, brother, daughter,  "daughter, daughter, daughter, father, sister, son, son, son, son, son, son, and son.    Social History     Tobacco Use    Smoking status: Every Day     Packs/day: 1.00     Types: Cigarettes    Smokeless tobacco: Never   Substance Use Topics    Alcohol use: Yes     Comment: occasional    Drug use: No       Review of Systems   Constitutional:  Negative for chills, fatigue and fever.   Respiratory:  Negative for cough and shortness of breath.    Cardiovascular:  Negative for chest pain, palpitations and leg swelling.   Genitourinary:  Negative for dysuria, flank pain and frequency.   Musculoskeletal:  Negative for joint swelling.     Outpatient Encounter Medications as of 2/3/2023   Medication Sig Dispense Refill    atorvastatin (LIPITOR) 20 MG tablet Take 1 tablet (20 mg total) by mouth once daily. 90 tablet 3    tadalafiL (CIALIS) 10 MG tablet Take 1 tablet (10 mg total) by mouth daily as needed for Erectile Dysfunction. 10 tablet 3    propranoloL (INDERAL) 40 MG tablet Take 1 tablet (40 mg total) by mouth 2 (two) times daily. 180 tablet 1    [DISCONTINUED] propranoloL (INDERAL) 40 MG tablet Take 1 tablet (40 mg total) by mouth 2 (two) times daily. 180 tablet 1     Facility-Administered Encounter Medications as of 2/3/2023   Medication Dose Route Frequency Provider Last Rate Last Admin    carbamide peroxide 6.5 % otic solution 5 drop  5 drop Both Ears 1 time in Clinic/HOD Salima Cummings MD            Review of patient's allergies indicates:  No Known Allergies    Physical Exam      Vital Signs  Pulse: 82  SpO2: 98 %  BP: (!) 140/82  BP Location: Left arm  Patient Position: Sitting  Pain Score: 0-No pain  Height and Weight  Height: 5' 10" (177.8 cm)  Weight: 96.5 kg (212 lb 12.8 oz)  BSA (Calculated - sq m): 2.18 sq meters  BMI (Calculated): 30.5  Weight in (lb) to have BMI = 25: 173.9]    Physical Exam  Vitals reviewed.   Constitutional:       Appearance: He is obese.   HENT:      Head: Normocephalic and " atraumatic.      Right Ear: There is no impacted cerumen.      Left Ear: There is no impacted cerumen.      Mouth/Throat:      Mouth: Mucous membranes are moist.   Eyes:      Extraocular Movements: Extraocular movements intact.      Pupils: Pupils are equal, round, and reactive to light.   Cardiovascular:      Rate and Rhythm: Normal rate and regular rhythm.      Pulses: Normal pulses.   Pulmonary:      Effort: Pulmonary effort is normal.      Breath sounds: Normal breath sounds.   Skin:     General: Skin is warm.   Neurological:      General: No focal deficit present.      Mental Status: He is alert.   Psychiatric:         Mood and Affect: Mood normal.        Laboratory:  CBC:  No results for input(s): WBC, RBC, HGB, HCT, PLT, MCV, MCH, MCHC in the last 2160 hours.  CMP:  No results for input(s): GLU, CALCIUM, ALBUMIN, PROT, NA, K, CO2, CL, BUN, ALKPHOS, ALT, AST, BILITOT in the last 2160 hours.    Invalid input(s): CREATININ  URINALYSIS:  No results for input(s): COLORU, CLARITYU, SPECGRAV, PHUR, PROTEINUA, GLUCOSEU, BILIRUBINCON, BLOODU, WBCU, RBCU, BACTERIA, MUCUS, NITRITE, LEUKOCYTESUR, UROBILINOGEN, HYALINECASTS in the last 2160 hours.   LIPIDS:  Recent Labs   Lab Result Units 12/05/22  1215   HDL mg/dL 40   Cholesterol mg/dL 287*   Triglycerides mg/dL 195*   LDL Cholesterol mg/dL 208.0*   HDL/Cholesterol Ratio % 13.9*   Non-HDL Cholesterol mg/dL 247   Total Cholesterol/HDL Ratio  7.2*     TSH:  No results for input(s): TSH in the last 2160 hours.  A1C:  Recent Labs   Lab Result Units 12/05/22  1215   Hemoglobin A1C % 5.7*       Radiology:      Assessment/Plan     Albert Escoto is a 47 y.o.male with:    1. Essential hypertension  -BP still persistently >130/80  -in the setting of migraine HA will start on inderall  - propranoloL (INDERAL) 40 MG tablet; Take 1 tablet (40 mg total) by mouth 2 (two) times daily.  Dispense: 180 tablet; Refill: 1  -counseled on med adherence  -Monitor and keep BP log    2.  Migraine with aura and without status migrainosus, not intractable  -  start    propranoloL (INDERAL) 40 MG tablet; Take 1 tablet (40 mg total) by mouth 2 (two) times daily.  Dispense: 180 tablet; Refill: 1  -Avoid triggers    3. Encounter for colorectal cancer screening  -     Case Request Endoscopy: COLONOSCOPY    4. BMI 30.0-30.9,adult  -c/w life style modifications    5. Prediabetes  -Last HbA1c elevated @ 5.7 (2/2022)  -c/w life style modifications      Patient verbalizes understanding and agrees with current treatment plan.      Dr Salima Cummings MD  [facility]

## 2023-03-03 ENCOUNTER — PATIENT OUTREACH (OUTPATIENT)
Dept: ADMINISTRATIVE | Facility: HOSPITAL | Age: 48
End: 2023-03-03
Payer: COMMERCIAL

## 2023-03-03 NOTE — PROGRESS NOTES
Care Everywhere updates requested and reviewed.  Immunizations reconciled. Media reports reviewed.  Duplicate HM overrides and  orders removed.  Overdue HM topic chart audit and/or requested.  Overdue lab testing linked to upcoming lab appointments if applies.        Health Maintenance Due   Topic Date Due    Colorectal Cancer Screening  Never done    COVID-19 Vaccine (4 - Booster for Moderna series) 2022

## 2023-03-13 DIAGNOSIS — N52.9 ERECTILE DYSFUNCTION, UNSPECIFIED ERECTILE DYSFUNCTION TYPE: ICD-10-CM

## 2023-03-13 RX ORDER — TADALAFIL 10 MG/1
TABLET ORAL
Qty: 10 TABLET | Refills: 0 | OUTPATIENT
Start: 2023-03-13

## 2023-03-13 NOTE — TELEPHONE ENCOUNTER
Spoke with pt and explained that before the medication can be refilled that he needs to have his blood work completed that was ordered on 02/2023.  Pt did ask that he be scheduled on 03/15/23.  He is scheduled.  Pt accepted and understood all verbally.         Please see the attached refill request.

## 2023-03-17 ENCOUNTER — OFFICE VISIT (OUTPATIENT)
Dept: FAMILY MEDICINE | Facility: CLINIC | Age: 48
End: 2023-03-17
Payer: COMMERCIAL

## 2023-03-17 VITALS
OXYGEN SATURATION: 99 % | HEIGHT: 70 IN | SYSTOLIC BLOOD PRESSURE: 122 MMHG | BODY MASS INDEX: 29.35 KG/M2 | DIASTOLIC BLOOD PRESSURE: 78 MMHG | HEART RATE: 78 BPM | WEIGHT: 205 LBS

## 2023-03-17 DIAGNOSIS — E55.9 VITAMIN D DEFICIENCY: ICD-10-CM

## 2023-03-17 DIAGNOSIS — R73.03 PREDIABETES: ICD-10-CM

## 2023-03-17 DIAGNOSIS — Z12.12 ENCOUNTER FOR COLORECTAL CANCER SCREENING: ICD-10-CM

## 2023-03-17 DIAGNOSIS — Z12.11 ENCOUNTER FOR COLORECTAL CANCER SCREENING: ICD-10-CM

## 2023-03-17 DIAGNOSIS — G43.109 MIGRAINE WITH AURA AND WITHOUT STATUS MIGRAINOSUS, NOT INTRACTABLE: ICD-10-CM

## 2023-03-17 DIAGNOSIS — I10 ESSENTIAL HYPERTENSION: ICD-10-CM

## 2023-03-17 PROCEDURE — 99214 OFFICE O/P EST MOD 30 MIN: CPT | Mod: S$GLB,,, | Performed by: STUDENT IN AN ORGANIZED HEALTH CARE EDUCATION/TRAINING PROGRAM

## 2023-03-17 PROCEDURE — 99214 PR OFFICE/OUTPT VISIT, EST, LEVL IV, 30-39 MIN: ICD-10-PCS | Mod: S$GLB,,, | Performed by: STUDENT IN AN ORGANIZED HEALTH CARE EDUCATION/TRAINING PROGRAM

## 2023-03-17 PROCEDURE — 99999 PR PBB SHADOW E&M-EST. PATIENT-LVL III: ICD-10-PCS | Mod: PBBFAC,,, | Performed by: STUDENT IN AN ORGANIZED HEALTH CARE EDUCATION/TRAINING PROGRAM

## 2023-03-17 PROCEDURE — 99999 PR PBB SHADOW E&M-EST. PATIENT-LVL III: CPT | Mod: PBBFAC,,, | Performed by: STUDENT IN AN ORGANIZED HEALTH CARE EDUCATION/TRAINING PROGRAM

## 2023-03-17 NOTE — PROGRESS NOTES
[facility] Note    Chief Complaint      Chief Complaint   Patient presents with    Follow-up     6 week (02/03/23)     History of Present Illness      Follow-up  Pertinent negatives include no chest pain, chills, coughing, fatigue, fever or joint swelling.     Albert Escoto is a 48 y.o. male with HLD , prediabetes and recent diagnosis of sHTN , and migraine HA  for f/u today. Last seen in the clinic 6weeks ago for migarine HA and was started on inderall . He endorses remarkable improvement in symptoms and has no new complaints today. He has also improved his daily exercise routine and now eats healthy. He described his mood to be great today.     Problem List Addressed This Visit:    1. Essential hypertension      2. Migraine with aura and without status migrainosus, not intractable    3. Encounter for colorectal cancer screening  -     Case Request Endoscopy: COLONOSCOPY    4. BMI   29  -now improving from 30.0-30.9,adult    5. Prediabetes    6. Vitamin D insufficiency         Health Maintenance   Topic Date Due    Lipid Panel  03/15/2028    TETANUS VACCINE  02/03/2030    Hepatitis C Screening  Completed       Past Medical History:   Diagnosis Date    Hypercholesteremia     Hypertension     Male erectile dysfunction, unspecified        No past surgical history on file.    family history includes Brain cancer in his mother; Diabetes in his brother; Lung disease in his mother; No Known Problems in his brother, brother, daughter, daughter, daughter, daughter, father, sister, son, son, son, son, son, son, and son.    Social History     Tobacco Use    Smoking status: Every Day     Packs/day: 1.00     Types: Cigarettes    Smokeless tobacco: Never   Substance Use Topics    Alcohol use: Yes     Comment: occasional    Drug use: No       Review of Systems   Constitutional:  Negative for chills, fatigue and fever.   Respiratory:  Negative for cough and shortness of breath.    Cardiovascular:   "Negative for chest pain, palpitations and leg swelling.   Genitourinary:  Negative for dysuria, flank pain and frequency.   Musculoskeletal:  Negative for joint swelling.     Outpatient Encounter Medications as of 3/17/2023   Medication Sig Dispense Refill    atorvastatin (LIPITOR) 20 MG tablet Take 1 tablet (20 mg total) by mouth once daily. 90 tablet 3    propranoloL (INDERAL) 40 MG tablet Take 1 tablet (40 mg total) by mouth 2 (two) times daily. 180 tablet 1    tadalafiL (CIALIS) 10 MG tablet Take 1 tablet (10 mg total) by mouth daily as needed for Erectile Dysfunction. 10 tablet 3     Facility-Administered Encounter Medications as of 3/17/2023   Medication Dose Route Frequency Provider Last Rate Last Admin    carbamide peroxide 6.5 % otic solution 5 drop  5 drop Both Ears 1 time in Clinic/HOD Salima Cummings MD            Review of patient's allergies indicates:  No Known Allergies    Physical Exam      Vital Signs  Pulse: 78  SpO2: 99 %  BP: 122/78  BP Location: Left arm  Patient Position: Sitting  Pain Score: 0-No pain  Height and Weight  Height: 5' 10" (177.8 cm)  Weight: 93 kg (205 lb)  BSA (Calculated - sq m): 2.14 sq meters  BMI (Calculated): 29.4  Weight in (lb) to have BMI = 25: 173.9]    Physical Exam  Vitals reviewed.   HENT:      Head: Normocephalic and atraumatic.      Right Ear: There is no impacted cerumen.      Left Ear: There is no impacted cerumen.      Mouth/Throat:      Mouth: Mucous membranes are moist.   Eyes:      Extraocular Movements: Extraocular movements intact.      Pupils: Pupils are equal, round, and reactive to light.   Cardiovascular:      Rate and Rhythm: Normal rate and regular rhythm.      Pulses: Normal pulses.   Pulmonary:      Effort: Pulmonary effort is normal.      Breath sounds: Normal breath sounds.   Skin:     General: Skin is warm.   Neurological:      General: No focal deficit present.      Mental Status: He is alert.   Psychiatric:         Mood and Affect: Mood " normal.        Laboratory:  CBC:  Recent Labs   Lab Result Units 03/15/23  0717   WBC K/uL 5.55   RBC M/uL 5.24   Hemoglobin g/dL 14.5   Hematocrit % 45.5   Platelets K/uL 219   MCV fL 87   MCH pg 27.7   MCHC g/dL 31.9*       CMP:  Recent Labs   Lab Result Units 03/15/23  0718   Glucose mg/dL 104   Calcium mg/dL 9.8   Albumin g/dL 5.2   Total Protein g/dL 8.8*   Sodium mmol/L 140   Potassium mmol/L 4.1   CO2 mmol/L 28   Chloride mmol/L 105   BUN mg/dL 15   Alkaline Phosphatase U/L 81   ALT U/L 27   AST U/L 26   Total Bilirubin mg/dL 0.9       URINALYSIS:  No results for input(s): COLORU, CLARITYU, SPECGRAV, PHUR, PROTEINUA, GLUCOSEU, BILIRUBINCON, BLOODU, WBCU, RBCU, BACTERIA, MUCUS, NITRITE, LEUKOCYTESUR, UROBILINOGEN, HYALINECASTS in the last 2160 hours.   LIPIDS:  Recent Labs   Lab Result Units 03/15/23  0717 03/15/23  0718   TSH uIU/mL  --  1.620   HDL mg/dL 45  --    Cholesterol mg/dL 189  --    Triglycerides mg/dL 84  --    LDL Cholesterol mg/dL 127.2  --    HDL/Cholesterol Ratio % 23.8  --    Non-HDL Cholesterol mg/dL 144  --    Total Cholesterol/HDL Ratio  4.2  --        TSH:  Recent Labs   Lab Result Units 03/15/23  0718   TSH uIU/mL 1.620       A1C:  Recent Labs   Lab Result Units 03/15/23  0717   Hemoglobin A1C % 5.8*         Radiology:      Assessment/Plan     Albert Escoto is a 48 y.o.male with:    1. Essential hypertension  -BP now at goal  -c/w current regimen  -Monitor and keep BP log    2. Migraine with aura and without status migrainosus, not intractable  Now well controlled on current maintenance therapy    3. Encounter for colorectal cancer screening  -     Case Request Endoscopy: COLONOSCOPY    4. BMI 29  -improving from 32  -Has lost 24lbs since last clinic visit  -c/w life style modifications    5. Prediabetes  -Last HbA1c elevated @ 5.7 (2/2022)  -c/w life style modifications    6. Vit D insufficiency  -c/w Vit D supplement    Patient verbalizes understanding and agrees with current  treatment plan.      Dr Salima Cummings MD  [facility]

## 2023-03-22 ENCOUNTER — TELEPHONE (OUTPATIENT)
Dept: GASTROENTEROLOGY | Facility: CLINIC | Age: 48
End: 2023-03-22
Payer: COMMERCIAL

## 2023-03-22 NOTE — LETTER
March 22, 2023    Albert Escoto  [address]  [place] [id number]             [facility]  [address], [place]   [place] [address]  Phone: [phone]  Fax: [phone] Dear Mr. Escoto:    We have attempted to contact you to schedule a screening colonoscopy that was ordered by your doctor. Please contact the office to schedule at [phone].       If you have any questions or concerns, please don't hesitate to call.    Sincerely,        Amber Morales MD

## 2023-04-11 ENCOUNTER — PATIENT MESSAGE (OUTPATIENT)
Dept: ADMINISTRATIVE | Facility: HOSPITAL | Age: 48
End: 2023-04-11
Payer: COMMERCIAL

## 2023-06-16 ENCOUNTER — PATIENT MESSAGE (OUTPATIENT)
Dept: PODIATRY | Facility: CLINIC | Age: 48
End: 2023-06-16
Payer: COMMERCIAL

## 2023-08-04 DIAGNOSIS — N52.9 ERECTILE DYSFUNCTION, UNSPECIFIED ERECTILE DYSFUNCTION TYPE: ICD-10-CM

## 2023-08-04 RX ORDER — TADALAFIL 10 MG/1
10 TABLET ORAL DAILY PRN
Qty: 10 TABLET | Refills: 0 | Status: SHIPPED | OUTPATIENT
Start: 2023-08-04 | End: 2023-11-02

## 2023-09-08 ENCOUNTER — PATIENT OUTREACH (OUTPATIENT)
Dept: ADMINISTRATIVE | Facility: HOSPITAL | Age: 48
End: 2023-09-08
Payer: COMMERCIAL

## 2023-09-08 ENCOUNTER — PATIENT MESSAGE (OUTPATIENT)
Dept: ADMINISTRATIVE | Facility: HOSPITAL | Age: 48
End: 2023-09-08
Payer: COMMERCIAL

## 2023-09-08 NOTE — PROGRESS NOTES
Care Everywhere updates requested and reviewed.  Immunizations reconciled. Media reports reviewed.  Duplicate HM overrides and  orders removed.  Overdue HM topic chart audit and/or requested.  Overdue lab testing linked to upcoming lab appointments if applies.        Health Maintenance Due   Topic Date Due    Colorectal Cancer Screening  Never done    Influenza Vaccine (1) 2023

## 2023-09-13 ENCOUNTER — OCCUPATIONAL HEALTH (OUTPATIENT)
Dept: URGENT CARE | Facility: CLINIC | Age: 48
End: 2023-09-13

## 2023-09-13 DIAGNOSIS — Z02.89 ENCOUNTER FOR EXAMINATION REQUIRED BY DEPARTMENT OF TRANSPORTATION (DOT): Primary | ICD-10-CM

## 2023-09-13 PROCEDURE — 92552 PURE TONE AUDIOMETRY AIR: CPT | Mod: S$GLB,,, | Performed by: STUDENT IN AN ORGANIZED HEALTH CARE EDUCATION/TRAINING PROGRAM

## 2023-09-13 PROCEDURE — 99499 UNLISTED E&M SERVICE: CPT | Mod: S$GLB,,, | Performed by: STUDENT IN AN ORGANIZED HEALTH CARE EDUCATION/TRAINING PROGRAM

## 2023-09-13 PROCEDURE — 99499 PHYSICAL, RECERT DOT/CDL: ICD-10-PCS | Mod: S$GLB,,, | Performed by: STUDENT IN AN ORGANIZED HEALTH CARE EDUCATION/TRAINING PROGRAM

## 2023-09-13 PROCEDURE — 92552 AUDIOGRAM OCC MED: ICD-10-PCS | Mod: S$GLB,,, | Performed by: STUDENT IN AN ORGANIZED HEALTH CARE EDUCATION/TRAINING PROGRAM

## 2023-10-24 ENCOUNTER — TELEPHONE (OUTPATIENT)
Dept: GASTROENTEROLOGY | Facility: CLINIC | Age: 48
End: 2023-10-24
Payer: COMMERCIAL

## 2023-10-24 NOTE — TELEPHONE ENCOUNTER
Contacted  patient to schedule a colonoscopy. No answer and no voice mail set up. Will send a letter.

## 2023-11-02 DIAGNOSIS — N52.9 ERECTILE DYSFUNCTION, UNSPECIFIED ERECTILE DYSFUNCTION TYPE: ICD-10-CM

## 2023-11-02 RX ORDER — TADALAFIL 10 MG/1
10 TABLET ORAL DAILY PRN
Qty: 10 TABLET | Refills: 0 | Status: SHIPPED | OUTPATIENT
Start: 2023-11-02 | End: 2023-11-14

## 2023-11-14 DIAGNOSIS — N52.9 ERECTILE DYSFUNCTION, UNSPECIFIED ERECTILE DYSFUNCTION TYPE: ICD-10-CM

## 2023-11-14 RX ORDER — TADALAFIL 10 MG/1
10 TABLET ORAL DAILY PRN
Qty: 90 TABLET | Refills: 3 | Status: SHIPPED | OUTPATIENT
Start: 2023-11-14

## 2023-11-17 NOTE — TELEPHONE ENCOUNTER
Please see the attached refill request.   Writer 

## 2024-03-04 ENCOUNTER — TELEPHONE (OUTPATIENT)
Dept: GASTROENTEROLOGY | Facility: CLINIC | Age: 49
End: 2024-03-04

## 2024-03-05 ENCOUNTER — OFFICE VISIT (OUTPATIENT)
Dept: PODIATRY | Facility: CLINIC | Age: 49
End: 2024-03-05

## 2024-03-05 VITALS — RESPIRATION RATE: 18 BRPM | BODY MASS INDEX: 30.73 KG/M2 | HEIGHT: 70 IN | WEIGHT: 214.63 LBS

## 2024-03-05 DIAGNOSIS — S99.929A INJURY OF GREAT TOENAIL: Primary | ICD-10-CM

## 2024-03-05 PROCEDURE — 99999 PR PBB SHADOW E&M-EST. PATIENT-LVL III: CPT | Mod: PBBFAC,,, | Performed by: PODIATRIST

## 2024-03-05 PROCEDURE — 99213 OFFICE O/P EST LOW 20 MIN: CPT | Mod: PBBFAC,PN,25 | Performed by: PODIATRIST

## 2024-03-05 PROCEDURE — 99203 OFFICE O/P NEW LOW 30 MIN: CPT | Mod: 25,S$PBB,, | Performed by: PODIATRIST

## 2024-03-05 PROCEDURE — 11730 AVULSION NAIL PLATE SIMPLE 1: CPT | Mod: PBBFAC,PN | Performed by: PODIATRIST

## 2024-03-05 RX ORDER — CEPHALEXIN 500 MG/1
500 CAPSULE ORAL EVERY 6 HOURS
Qty: 28 CAPSULE | Refills: 0 | Status: SHIPPED | OUTPATIENT
Start: 2024-03-05 | End: 2024-03-12

## 2024-03-05 NOTE — PATIENT INSTRUCTIONS
Instructions for Care after Ingrown Nail removal    General Information: Stay off your feet as much as possible today. You may wear a surgical shoe, sandal or any open toed shoe that does not squeeze, constrict or put pressure on your toe(s). Your toe(s) may remain numb for up to 2-24 hours after the procedure. Although most patients can wear a closed loose fitting shoe after the first week, the toe will heal faster the more you use the open toed shoe in the first 2-3  weeks. Please contact our office if you have any questions or concerns.    Bleeding: Slight bleeding, discoloration and drainage are normal. Due to the chemical used there may be some yellow-clear drainage coming from the toe for 2-3 weeks.    Discomfort: You can elevate your foot to help alleviate minor swelling, bleeding and discomfort. You may also take Advil, Tylenol or other over the counter pain medications to help alleviate pain. Call our office if the pain is not well controlled. Most patients have very little discomfort as long as they minimize their walking for the first 24 hours and do not bump the toe.    Removing the Bandage: Starting the day after the procedure, carefully remove the dressing and shower or bathe as normal. It is Ok to get the bandage soaking wet in the shower and when you remove it, it should not stick to the surgery site.    Dressing Options- Traditional Method:  1. Soaking two times a day in WARM water with Epsom salts or diluted Povidone Iodine  (Betadine) for 15-20 minutes. You will need to purchase these products from the pharmacy.  2. Dry toe then apply an antibiotic cream or ointment such as Neosporin or Polysporin plus or  Garamycin and cover with a 2 x 2 inch size gauze and then secure with a 1 inch band aid.  3. In the second week, take the dressing off at bedtime to air dry the toe.  4. If the toe is infected take the Antibiotic Pills as directed until finished.

## 2024-03-05 NOTE — PROGRESS NOTES
[facility]  [address]  [address]  [place] [address]  Dept: [phone]  Dept Fax: [phone]    Pacheco Rosa Jr., DPM     Assessment:   [facility]    Coding  1. Injury of great toenail - Left Foot  Ambulatory referral/consult to Podiatry          Plan:     Nail Removal    Date/Time: 3/5/2024 10:15 AM    Performed by: Pacheco Rosa Jr., DPM  Authorized by: Pacheco Rosa Jr., DPM    Consent Done?:  Yes (Verbal)  Time out: Immediately prior to the procedure a time out was called    Prep: patient was prepped and draped in usual sterile fashion    Location:     Location:  Left foot    Location detail:  Left big toe  Anesthesia:     Anesthesia:  See MAR for details    Local anesthetic:  Lidocaine spray and topical anesthetic  Procedure Details:     Preparation:  Skin prepped with alcohol, skin prepped with Betadine and sterile field established    Amount removed:  Complete    Wedge excision of skin of nail fold: No      Nail bed sutured?: No      Nail matrix removed:  None    Removed nail replaced and anchored: No      Dressing applied:  4x4, antibiotic ointment, gauze roll, petrolatum-impregnated gauze and dressing applied    Patient tolerance:  Patient tolerated the procedure well with no immediate complications      Albert was seen today for nail problem.    Diagnoses and all orders for this visit:    Injury of great toenail - Left Foot  -     Ambulatory referral/consult to Podiatry        -pt seen, evaluated, and managed  -dx discussed in detail. All questions/concerns addressed  -all tx options discussed. All alternatives, risks, benefits of all txs discussed  -The patient was educated regarding the above diagnosis.   -discussed ingrowing toenails and all tx options. Pt opts for avulsion as above  -pt to perform epsom salt or betadine soaks once daily x 2wks. Written instructions dispensed  -keep toe covered with triple abx ointment + bandaid x 2wks  -will plan for  procedural removal at nxt visit or if ingrown nails recur    Rx dispensed: keflex  Referrals: none  -WB: wbat      Follow up in about 4 weeks (around 4/2/2024).    Subjective:      Patient ID: Albert Escoto is a 49 y.o. male.    Chief Complaint:   Chief Complaint   Patient presents with    Nail Problem     Left        CC - ingrown nail: Patient presents to the clinic complaining of painful ingrown toenail on the left foot. Patients rates pain 5/10 on pain scale. patient seeking tx options. Reports nail partially avulsed after injury wk ago.     Nail Problem        Last Podiatry Enc: Visit date not found  Last Enc w/ Me: Visit date not found    Outside reports reviewed: historical medical records.  Family hx: as below  Past Medical History:   Diagnosis Date    Hypercholesteremia     Hypertension     Male erectile dysfunction, unspecified      No past surgical history on file.  Family History   Problem Relation Age of Onset    Brain cancer Mother     Lung disease Mother     No Known Problems Father     No Known Problems Sister     Diabetes Brother     No Known Problems Daughter     No Known Problems Son     No Known Problems Brother     No Known Problems Brother     No Known Problems Daughter     No Known Problems Daughter     No Known Problems Daughter     No Known Problems Son     No Known Problems Son     No Known Problems Son     No Known Problems Son     No Known Problems Son     No Known Problems Son      Current Outpatient Medications   Medication Sig Dispense Refill    mupirocin (BACTROBAN) 2 % ointment Apply topically 3 (three) times daily. 30 g 0    tadalafiL (CIALIS) 10 MG tablet TAKE 1 TABLET BY MOUTH ONCE DAILY AS NEEDED FOR ERECTILE DYSFUNCTION 90 tablet 3    atorvastatin (LIPITOR) 20 MG tablet Take 1 tablet (20 mg total) by mouth once daily. 90 tablet 3    HYDROcodone-acetaminophen (NORCO) 5-325 mg per tablet Take 1 tablet by mouth every 6 (six) hours as needed. (Patient not taking: Reported on  "3/5/2024) 12 tablet 0    propranoloL (INDERAL) 40 MG tablet Take 1 tablet (40 mg total) by mouth 2 (two) times daily. 180 tablet 1     Current Facility-Administered Medications   Medication Dose Route Frequency Provider Last Rate Last Admin    carbamide peroxide 6.5 % otic solution 5 drop  5 drop Both Ears 1 time in Clinic/HOD Salima Cummings MD         Review of patient's allergies indicates:  No Known Allergies  Social History     Socioeconomic History    Marital status:    Tobacco Use    Smoking status: Former     Types: Cigarettes    Smokeless tobacco: Never   Substance and Sexual Activity    Alcohol use: Yes     Comment: occasional    Drug use: No    Sexual activity: Yes       ROS    REVIEW OF SYSTEMS: Negative as documented below as well as positive findings in bold.       Constitutional  Respiratory  Gastrointestinal  Skin   - Fever - Cough - Heartburn - Rash   - Chills - Spit blood - Nausea - Itching   - Weight Loss - Shortness of breath - Vomiting - Nail pain   - Malaise/Fatigue - Wheezing - Abdominal Pain  Wound/Ulcer   - Weight Gain   - Blood in Stool  Poor wound healing       - Diarrhea          Cardiovascular  Genitourinary  Neurological  HEENT   - Chest Pain - Dysuria - Burning Sensation of feet - Headache   - Palpitations - Hematuria - Tingling / Paresthesia - Congestion   - Pain at night in legs - Flank Pain - Dizziness - Sore Throat   - Cramping   - Tremor - Blurred Vision   - Leg Swelling   - Sensory Change - Double Vision   - Dizzy when standing   - Speech Change - Eye Redness       - Focal Weakness - Dry Eyes       - Loss of Consciousness          Endocrine  Musculoskeletal  Psychiatric   - Cold intolerance - Muscle Pain - Depression   - Heat intolerance - Neck Pain - Insomnia   - Anemia - Joint Pain - Memory Loss   -  Easy bruising, bleeding - Heel pain - Anxiety      Toe Pain        Leg/Ankle/Foot Pain         Objective:     Resp 18   Ht 5' 10" (1.778 m)   Wt 97.3 kg (214 lb " "9.9 oz)   BMI 30.79 kg/m²   Vitals:    03/05/24 1031   Resp: 18   Weight: 97.3 kg (214 lb 9.9 oz)   Height: 5' 10" (1.778 m)   PainSc: 0-No pain       Physical Exam    General Appearance:   Patient appears well developed, well nourished  Patient appears stated age    Psychiatric:   Patient is oriented to time, place, and person.  Patient has appropriate mood and affect    Neck:  Trachea Midline  No visible masses    Respiratory/Ears:  No distress or labored breathing.  Able to differentiate between normal talking voice and whisper.  Able to follow commands    Eyes:  Visual Acuity intact  Lids and conjunctivae normal. No discoloration noted.    Foot Exam  Physical Exam  Ortho Exam  Ortho/SPM Exam  Physical Exam  Neurologic Exam    L LE exam con't:  V:  DP 2/4, PT 2/4   CRT< 3s to all digits tested   Tibial and popliteal lymph nodes are w/o abnormality        N:  Patient displays normal ankle reflexes   SILT in SP/DP/T/Martin/Saph distributions    Ortho: +Motor EHL/FHL/TA/GA   +TTP L great toe  Compartments soft/compressible. No pain on passive stretch of big toe. No calf  Pain.    Derm:  skin intact, skin warm and dry, skin without ulcers or lesions, skin without induration, left, great toe ingrowing and incurvated and partially avulsed    Imaging / Labs:      No results found.      Note: This was dictated using a computer transcription program. Although proofread, it may contain computer transcription errors and phonetic errors. Other human proofreading errors may also exist. Corrections may be performed at a later time. Please contact us for any clarification if needed.    Pacheco Rosa DPM  [facility]    "

## 2024-04-08 ENCOUNTER — PATIENT OUTREACH (OUTPATIENT)
Dept: ADMINISTRATIVE | Facility: HOSPITAL | Age: 49
End: 2024-04-08

## 2024-04-08 ENCOUNTER — PATIENT MESSAGE (OUTPATIENT)
Dept: ADMINISTRATIVE | Facility: HOSPITAL | Age: 49
End: 2024-04-08

## 2024-04-08 NOTE — PROGRESS NOTES
Population Health Chart Review & Patient Outreach Details      Additional [facility] Health Notes:               Updates Requested / Reviewed:      Updated Care Coordination Note, Care Everywhere, and Immunizations Reconciliation Completed or Queried: [place]         Health Maintenance Topics Overdue:      [facility] Score: 3     Colon Cancer Screening  Uncontrolled BP  Hemoglobin A1c                       Health Maintenance Topic(s) Outreach Outcomes & Actions Taken:    Blood Pressure - Outreach Outcomes & Actions Taken  :      Primary Care Appt - Outreach Outcomes & Actions Taken  :

## 2024-04-10 DIAGNOSIS — R73.03 PREDIABETES: ICD-10-CM

## 2024-04-10 DIAGNOSIS — Z00.00 ANNUAL PHYSICAL EXAM: Primary | ICD-10-CM

## 2024-04-10 DIAGNOSIS — I10 ESSENTIAL HYPERTENSION: ICD-10-CM

## 2024-04-24 ENCOUNTER — PATIENT MESSAGE (OUTPATIENT)
Dept: ADMINISTRATIVE | Facility: HOSPITAL | Age: 49
End: 2024-04-24

## 2024-04-24 ENCOUNTER — PATIENT OUTREACH (OUTPATIENT)
Dept: ADMINISTRATIVE | Facility: HOSPITAL | Age: 49
End: 2024-04-24

## 2024-04-24 NOTE — PROGRESS NOTES
Population Health Chart Review & Patient Outreach Details      Additional [facility] Health Notes:               Updates Requested / Reviewed:      Updated Care Coordination Note, Care Everywhere, Care Team Updated, and Immunizations Reconciliation Completed or Queried: [facility] Topics Overdue:      [facility] Score: 3     Colon Cancer Screening  Uncontrolled BP  Hemoglobin A1c                       Health Maintenance Topic(s) Outreach Outcomes & Actions Taken:    Primary Care Appt - Outreach Outcomes & Actions Taken  :

## 2025-01-10 ENCOUNTER — PATIENT OUTREACH (OUTPATIENT)
Dept: ADMINISTRATIVE | Facility: HOSPITAL | Age: 50
End: 2025-01-10

## 2025-01-10 NOTE — PROGRESS NOTES
01/10/2025  VB chart audit performed. Care Everywhere updates requested and reviewed  Overdue HM topic chart audit and/or requested. LINKS triggered and reconciled. Media reviewed Lvm/portal sent regarding overdue health topics

## 2025-02-27 ENCOUNTER — PATIENT OUTREACH (OUTPATIENT)
Dept: ADMINISTRATIVE | Facility: HOSPITAL | Age: 50
End: 2025-02-27

## 2025-02-27 NOTE — PROGRESS NOTES
02/27/2025  VB chart audit performed. Care Everywhere updates requested and reviewed  Overdue HM topic chart audit and/or requested. LINKS triggered and reconciled. Media reviewed Lvm/portal sent regarding overdue health topics